# Patient Record
Sex: MALE | Race: WHITE | NOT HISPANIC OR LATINO | Employment: OTHER | URBAN - METROPOLITAN AREA
[De-identification: names, ages, dates, MRNs, and addresses within clinical notes are randomized per-mention and may not be internally consistent; named-entity substitution may affect disease eponyms.]

---

## 2021-07-07 ENCOUNTER — HOSPITAL ENCOUNTER (EMERGENCY)
Facility: HOSPITAL | Age: 56
Discharge: HOME | End: 2021-07-08
Attending: EMERGENCY MEDICINE | Admitting: INTERNAL MEDICINE
Payer: COMMERCIAL

## 2021-07-07 DIAGNOSIS — R46.2 BIZARRE BEHAVIOR: Primary | ICD-10-CM

## 2021-07-07 DIAGNOSIS — F22 DELUSIONS (HCC): ICD-10-CM

## 2021-07-07 LAB
ALBUMIN SERPL BCP-MCNC: 4.9 G/DL (ref 3.4–4.8)
ALP SERPL-CCNC: 83.1 U/L (ref 10–129)
ALT SERPL W P-5'-P-CCNC: 120 U/L (ref 5–63)
ANION GAP SERPL CALCULATED.3IONS-SCNC: 17 MMOL/L (ref 4–13)
AST SERPL W P-5'-P-CCNC: 67 U/L (ref 15–41)
BASOPHILS # BLD AUTO: 0.03 THOUSANDS/ΜL (ref 0–0.1)
BASOPHILS NFR BLD AUTO: 0 % (ref 0–1)
BILIRUB SERPL-MCNC: 1.19 MG/DL (ref 0.3–1.2)
BUN SERPL-MCNC: 39 MG/DL (ref 6–20)
CALCIUM SERPL-MCNC: 10.6 MG/DL (ref 8.4–10.2)
CHLORIDE SERPL-SCNC: 101 MMOL/L (ref 96–108)
CO2 SERPL-SCNC: 22 MMOL/L (ref 22–33)
CREAT SERPL-MCNC: 1.35 MG/DL (ref 0.5–1.2)
EOSINOPHIL # BLD AUTO: 0.04 THOUSAND/ΜL (ref 0–0.61)
EOSINOPHIL NFR BLD AUTO: 1 % (ref 0–6)
ERYTHROCYTE [DISTWIDTH] IN BLOOD BY AUTOMATED COUNT: 13 % (ref 11.6–15.1)
ETHANOL EXG-MCNC: 0 MG/DL
ETHANOL SERPL-MCNC: <10 MG/DL
GFR SERPL CREATININE-BSD FRML MDRD: 58 ML/MIN/1.73SQ M
GLUCOSE SERPL-MCNC: 101 MG/DL (ref 65–140)
HCT VFR BLD AUTO: 49.6 % (ref 36.5–49.3)
HGB BLD-MCNC: 17.6 G/DL (ref 12–17)
IMM GRANULOCYTES # BLD AUTO: 0.02 THOUSAND/UL (ref 0–0.2)
IMM GRANULOCYTES NFR BLD AUTO: 0 % (ref 0–2)
LYMPHOCYTES # BLD AUTO: 1.59 THOUSANDS/ΜL (ref 0.6–4.47)
LYMPHOCYTES NFR BLD AUTO: 19 % (ref 14–44)
MAGNESIUM SERPL-MCNC: 1.9 MG/DL (ref 1.6–2.6)
MCH RBC QN AUTO: 30.9 PG (ref 26.8–34.3)
MCHC RBC AUTO-ENTMCNC: 35.5 G/DL (ref 31.4–37.4)
MCV RBC AUTO: 87 FL (ref 82–98)
MONOCYTES # BLD AUTO: 1.44 THOUSAND/ΜL (ref 0.17–1.22)
MONOCYTES NFR BLD AUTO: 17 % (ref 4–12)
NEUTROPHILS # BLD AUTO: 5.33 THOUSANDS/ΜL (ref 1.85–7.62)
NEUTS SEG NFR BLD AUTO: 63 % (ref 43–75)
PLATELET # BLD AUTO: 188 THOUSANDS/UL (ref 149–390)
PMV BLD AUTO: 10.9 FL (ref 8.9–12.7)
POTASSIUM SERPL-SCNC: 3.4 MMOL/L (ref 3.5–5)
PROT SERPL-MCNC: 8 G/DL (ref 6.4–8.3)
RBC # BLD AUTO: 5.69 MILLION/UL (ref 3.88–5.62)
SODIUM SERPL-SCNC: 140 MMOL/L (ref 133–145)
WBC # BLD AUTO: 8.45 THOUSAND/UL (ref 4.31–10.16)

## 2021-07-07 PROCEDURE — 82075 ASSAY OF BREATH ETHANOL: CPT | Performed by: EMERGENCY MEDICINE

## 2021-07-07 PROCEDURE — 36415 COLL VENOUS BLD VENIPUNCTURE: CPT | Performed by: EMERGENCY MEDICINE

## 2021-07-07 PROCEDURE — 83735 ASSAY OF MAGNESIUM: CPT | Performed by: EMERGENCY MEDICINE

## 2021-07-07 PROCEDURE — 99285 EMERGENCY DEPT VISIT HI MDM: CPT | Performed by: EMERGENCY MEDICINE

## 2021-07-07 PROCEDURE — 87635 SARS-COV-2 COVID-19 AMP PRB: CPT | Performed by: EMERGENCY MEDICINE

## 2021-07-07 PROCEDURE — 99285 EMERGENCY DEPT VISIT HI MDM: CPT

## 2021-07-07 PROCEDURE — 84443 ASSAY THYROID STIM HORMONE: CPT | Performed by: EMERGENCY MEDICINE

## 2021-07-07 PROCEDURE — 82077 ASSAY SPEC XCP UR&BREATH IA: CPT | Performed by: EMERGENCY MEDICINE

## 2021-07-07 PROCEDURE — 80053 COMPREHEN METABOLIC PANEL: CPT | Performed by: EMERGENCY MEDICINE

## 2021-07-07 PROCEDURE — 85025 COMPLETE CBC W/AUTO DIFF WBC: CPT | Performed by: EMERGENCY MEDICINE

## 2021-07-07 PROCEDURE — 93005 ELECTROCARDIOGRAM TRACING: CPT

## 2021-07-08 VITALS
RESPIRATION RATE: 19 BRPM | BODY MASS INDEX: 21.09 KG/M2 | HEIGHT: 63 IN | SYSTOLIC BLOOD PRESSURE: 132 MMHG | TEMPERATURE: 97.8 F | HEART RATE: 80 BPM | OXYGEN SATURATION: 98 % | WEIGHT: 119 LBS | DIASTOLIC BLOOD PRESSURE: 76 MMHG

## 2021-07-08 LAB
ALBUMIN SERPL BCP-MCNC: 3.9 G/DL (ref 3.4–4.8)
ALP SERPL-CCNC: 65.7 U/L (ref 10–129)
ALT SERPL W P-5'-P-CCNC: 94 U/L (ref 5–63)
AMPHETAMINES SERPL QL SCN: NEGATIVE
ANION GAP SERPL CALCULATED.3IONS-SCNC: 13 MMOL/L (ref 4–13)
AST SERPL W P-5'-P-CCNC: 53 U/L (ref 15–41)
ATRIAL RATE: 100 BPM
BACTERIA UR QL AUTO: ABNORMAL /HPF
BARBITURATES UR QL: NEGATIVE
BENZODIAZ UR QL: NEGATIVE
BILIRUB SERPL-MCNC: 0.87 MG/DL (ref 0.3–1.2)
BILIRUB UR QL STRIP: ABNORMAL
BUN SERPL-MCNC: 34 MG/DL (ref 6–20)
CALCIUM SERPL-MCNC: 8.8 MG/DL (ref 8.4–10.2)
CHLORIDE SERPL-SCNC: 106 MMOL/L (ref 96–108)
CLARITY UR: CLEAR
CO2 SERPL-SCNC: 21 MMOL/L (ref 22–33)
COCAINE UR QL: NEGATIVE
COLOR UR: ABNORMAL
CREAT SERPL-MCNC: 0.98 MG/DL (ref 0.5–1.2)
GFR SERPL CREATININE-BSD FRML MDRD: 85 ML/MIN/1.73SQ M
GLUCOSE SERPL-MCNC: 67 MG/DL (ref 65–140)
GLUCOSE UR STRIP-MCNC: NEGATIVE MG/DL
HGB UR QL STRIP.AUTO: NEGATIVE
HYALINE CASTS #/AREA URNS LPF: ABNORMAL /LPF
KETONES UR STRIP-MCNC: ABNORMAL MG/DL
LEUKOCYTE ESTERASE UR QL STRIP: NEGATIVE
METHADONE UR QL: NEGATIVE
MUCOUS THREADS UR QL AUTO: ABNORMAL
NITRITE UR QL STRIP: NEGATIVE
NON-SQ EPI CELLS URNS QL MICRO: ABNORMAL /HPF
OPIATES UR QL SCN: NEGATIVE
OXYCODONE+OXYMORPHONE UR QL SCN: NEGATIVE
P AXIS: 90 DEGREES
PCP UR QL: NEGATIVE
PH UR STRIP.AUTO: 6 [PH]
POTASSIUM SERPL-SCNC: 3.6 MMOL/L (ref 3.5–5)
PR INTERVAL: 101 MS
PROT SERPL-MCNC: 6.4 G/DL (ref 6.4–8.3)
PROT UR STRIP-MCNC: ABNORMAL MG/DL
QRS AXIS: 82 DEGREES
QRSD INTERVAL: 95 MS
QT INTERVAL: 362 MS
QTC INTERVAL: 463 MS
RBC #/AREA URNS AUTO: ABNORMAL /HPF
SARS-COV-2 RNA RESP QL NAA+PROBE: NEGATIVE
SODIUM SERPL-SCNC: 140 MMOL/L (ref 133–145)
SP GR UR STRIP.AUTO: >=1.03 (ref 1–1.03)
T WAVE AXIS: -52 DEGREES
THC UR QL: NEGATIVE
TSH SERPL DL<=0.05 MIU/L-ACNC: 1.86 UIU/ML (ref 0.34–5.6)
UROBILINOGEN UR QL STRIP.AUTO: 1 E.U./DL
VENTRICULAR RATE: 98 BPM
WBC #/AREA URNS AUTO: ABNORMAL /HPF

## 2021-07-08 PROCEDURE — 36415 COLL VENOUS BLD VENIPUNCTURE: CPT | Performed by: EMERGENCY MEDICINE

## 2021-07-08 PROCEDURE — 80307 DRUG TEST PRSMV CHEM ANLYZR: CPT | Performed by: EMERGENCY MEDICINE

## 2021-07-08 PROCEDURE — 96361 HYDRATE IV INFUSION ADD-ON: CPT

## 2021-07-08 PROCEDURE — 93010 ELECTROCARDIOGRAM REPORT: CPT | Performed by: INTERNAL MEDICINE

## 2021-07-08 PROCEDURE — 96360 HYDRATION IV INFUSION INIT: CPT

## 2021-07-08 PROCEDURE — 80053 COMPREHEN METABOLIC PANEL: CPT | Performed by: EMERGENCY MEDICINE

## 2021-07-08 PROCEDURE — 81001 URINALYSIS AUTO W/SCOPE: CPT | Performed by: EMERGENCY MEDICINE

## 2021-07-08 RX ORDER — POTASSIUM CHLORIDE 20 MEQ/1
20 TABLET, EXTENDED RELEASE ORAL ONCE
Status: COMPLETED | OUTPATIENT
Start: 2021-07-08 | End: 2021-07-08

## 2021-07-08 RX ADMIN — SODIUM CHLORIDE 1000 ML: 0.9 INJECTION, SOLUTION INTRAVENOUS at 00:19

## 2021-07-08 RX ADMIN — POTASSIUM CHLORIDE 20 MEQ: 1500 TABLET, EXTENDED RELEASE ORAL at 00:21

## 2021-07-08 RX ADMIN — SODIUM CHLORIDE 1000 ML: 0.9 INJECTION, SOLUTION INTRAVENOUS at 00:17

## 2021-07-08 NOTE — ED NOTES
Patient is a 61year old male who arrived to the ED via ambulance last night  He reports that his friend called 911 because he was irritated and loud and typically he is very calm  He is medically cleared and evaluated in person the morning after arrival   Patient is noted to be disheveled with poor oral hygiene and overgrown hair  Cheeks are sunken in  He is pleasant, however, and very cooperative  He was oriented to person, place, and situation  Needed cueing for time / date  He was clearly religiously preoccupied with slight paranoia about a neighbor and/or witch that he believes left a bag of snake heads in his new apartment  He was easily redirected from this, but 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  He reports that he was previously  with 6 children and that he "messed up"  Whether or not it was related to the demise of his marriage, he reported that he was previously an IV heroin user and that he had related criminal charges to this along with theft  He denies violent crimes  He reports that he is currently on Probation with [facility] for past charges  He reports that he became a  and his life changed and God healed him through prayer  He denies any current or past suicidal ideas, plan, intent  Denies self harm  Denies any homicidal ideas, plan, intent  Denies violence, aggression, and harm to others  He does candidly admit to hearing voices and seeing things, but attests that this is a part of his spiritual awakening and that he is a messenger of God  He reports also seeing and hearing demons, but he is able to dispell these  Furthermore, he reports that he has experienced this his entire life and knows the difference between this and the physical world and he would never respond to the voices or visions in a way that would be harmful to himself or others    While he did allude to some paranoia about snake heads being placed in his apartment, he mentioned this only once briefly and otherwise expressed no paranoid delusions  His religiousity was evident, but there was no associated dangerousness  He admits subjectively to depression and anxiety, but has a very positive outlook and he is rather upbeat with no outward signs of depression or anxiety  He is also able to engage in conversation without any evidence that he is actively responding to internal stimuli  Patient admits that his sleep and appetite are sometimes affected by his need to pray or influenced by his devotional acts, such as fasting or praying into the night, but he assures that when he is fasting, he is still eating one meal per day, and that when he needs to sleep, he does  He admits that he is affected by others' emotions and strife and will sometimes be preoccupied with this and unable to sleep, so when that happens, he stays awake to pray and feels that this has helped save people  He talks about a women with a 12 lb tumor whom he prayed for and who is alive today  There is some grandiosity in this, but again, no overt dangerousness  Patient [facility], but this is questionable as he adds that he is waiting for his PCP to make the arrangements since Covid  He also admits that while he was previously on Celexa and BuSpar, he "doesn't take medicine    I don't need it"  Insight is limited, but again, there is no lethality and no overt concerns for safety related to his thought disturbance and Alevism focus  He was offered a 201, but declined  He was offered Partial referral; declined  Offered outpatient resources; declined (but provided anyway and encouraged to review in the event that the [facility] and/or 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; he was accepting of this)  No criteria for 302  Patient requests phone to call a friend for discharge and a phone was provided    Dr Jamal Reeves aware and is in support  Pt is a 61 y o  male who presented to the ED due to   Chief Complaint   Patient presents with   [facility]     pt presents to ed via EMS after he called them for a snake bit to the right foot and now he has babies living in his foot  also states the witch that lives below him is controling all the ticks and making them bit him and give him lymes diesease pt denies SI or HI        Intake Assessment completed, Safety Risk Assessment completed

## 2021-07-08 NOTE — ED NOTES
Patient sleeping on stretcher with even and unlabored respirations       Liv Harvey RN  07/08/21 8656

## 2021-07-08 NOTE — ED NOTES
Patient resting on stretcher with even respirations, no distress noted        Sameera Guillaume RN  07/08/21 9138

## 2021-07-08 NOTE — ED PROVIDER NOTES
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  Patient states that he previously used heroin and other drugs  Patient states that he is supposed to be on multiple medications, but discontinued them approximately 2 months ago  Patient denies suicidal and homicidal ideation  Patient states that he is a   None       History reviewed  No pertinent past medical history  History reviewed  No pertinent surgical history  History reviewed  No pertinent family history  I have reviewed and agree with the history as documented  E-Cigarette/Vaping    E-Cigarette Use Never User      E-Cigarette/Vaping Substances     Social History     Tobacco Use    Smoking status: Current Every Day Smoker    Smokeless tobacco: Never Used   Vaping Use    Vaping Use: Never used   Substance Use Topics    Alcohol use: Not Currently    Drug use: Yes     Types: Marijuana       Review of Systems   Constitutional: Negative for chills and fever  HENT: Negative for ear pain and sore throat  Eyes: Negative for pain and visual disturbance  Respiratory: Negative for cough and shortness of breath  Cardiovascular: Negative for chest pain and palpitations  Gastrointestinal: Negative for abdominal pain and vomiting  Genitourinary: Negative for dysuria and hematuria     Musculoskeletal: Negative for arthralgias and back pain  Skin: Negative for color change and rash  Neurological: Negative for seizures and syncope  Psychiatric/Behavioral: Negative for self-injury and suicidal ideas  Paranoid delusions   All other systems reviewed and are negative  Physical Exam  Physical Exam  Vitals and nursing note reviewed  Constitutional:       General: He is not in acute distress  Appearance: He is well-developed  HENT:      Head: Normocephalic and atraumatic  Right Ear: External ear normal       Left Ear: External ear normal       Nose: Nose normal       Mouth/Throat:      Pharynx: Oropharynx is clear  Eyes:      General: No scleral icterus  Conjunctiva/sclera: Conjunctivae normal    Cardiovascular:      Rate and Rhythm: Normal rate and regular rhythm  Heart sounds: No murmur heard  Pulmonary:      Effort: Pulmonary effort is normal  No respiratory distress  Breath sounds: Normal breath sounds  Abdominal:      Palpations: Abdomen is soft  Tenderness: There is no abdominal tenderness  Musculoskeletal:         General: No deformity or signs of injury  Cervical back: Normal range of motion and neck supple  Skin:     General: Skin is warm and dry  Neurological:      General: No focal deficit present  Mental Status: He is alert  Cranial Nerves: No cranial nerve deficit  Sensory: No sensory deficit     Psychiatric:      Comments: Appears anxious; paranoid delusions; no homicidal or suicidal ideation         Vital Signs  ED Triage Vitals [07/07/21 2244]   Temperature Pulse Respirations Blood Pressure SpO2   97 8 °F (36 6 °C) (!) 106 18 119/90 97 %      Temp Source Heart Rate Source Patient Position - Orthostatic VS BP Location FiO2 (%)   Oral Monitor Sitting Left arm --      Pain Score       --           Vitals:    07/07/21 2244 07/08/21 0247   BP: 119/90 132/76   Pulse: (!) 106 80   Patient Position - Orthostatic VS: Sitting Lying         Visual Acuity      ED Medications  Medications   potassium chloride (K-DUR,KLOR-CON) CR tablet 20 mEq (20 mEq Oral Given 7/8/21 0021)   sodium chloride 0 9 % bolus 1,000 mL (0 mL Intravenous Stopped 7/8/21 0242)   sodium chloride 0 9 % bolus 1,000 mL (0 mL Intravenous Stopped 7/8/21 0242)       Diagnostic Studies  Results Reviewed     Procedure Component Value Units Date/Time    Urine Microscopic [[id number]]  (Abnormal) Collected: 07/08/21 0359    Lab Status: Final result Specimen: Urine, Clean Catch Updated: 07/08/21 0447     RBC, UA 0-5 /hpf      WBC, UA 4-10 /hpf      Epithelial Cells Occasional /hpf      Bacteria, UA Innumerable /hpf      Hyaline Casts, UA 4-10 /lpf      MUCUS THREADS Innumerable    Rapid drug screen, urine [[id number]]  (Normal) Collected: 07/08/21 0359    Lab Status: Final result Specimen: Urine, Clean Catch Updated: 07/08/21 0441     Amph/Meth UR Negative     Barbiturate Ur Negative     Benzodiazepine Urine Negative     Cocaine Urine Negative     Methadone Urine Negative     Opiate Urine Negative     PCP Ur Negative     THC Urine Negative     Oxycodone Urine Negative    Narrative:      FOR MEDICAL PURPOSES ONLY  IF CONFIRMATION NEEDED PLEASE CONTACT THE LAB WITHIN 5 DAYS      Drug Screen Cutoff Levels:  AMPHETAMINE/METHAMPHETAMINES  1000 ng/mL  BARBITURATES     200 ng/mL  BENZODIAZEPINES     200 ng/mL  COCAINE      300 ng/mL  METHADONE      300 ng/mL  OPIATES      300 ng/mL  PHENCYCLIDINE     25 ng/mL  THC       50 ng/mL  OXYCODONE      100 ng/mL    UA w Reflex to Microscopic w Reflex to Culture [[id number]]  (Abnormal) Collected: 07/08/21 0359    Lab Status: Final result Specimen: Urine, Clean Catch Updated: 07/08/21 0423     Color, UA Tran     Clarity, UA Clear     Specific Gravity, UA >=1 030     pH, UA 6 0     Leukocytes, UA Negative     Nitrite, UA Negative     Protein, UA Trace mg/dl      Glucose, UA Negative mg/dl      Ketones, UA 40 (2+) mg/dl      Urobilinogen, UA 1 0 E U /dl Bilirubin, UA 1+     Blood, UA Negative    Comprehensive metabolic panel [[id number]]  (Abnormal) Collected: 07/08/21 0242    Lab Status: Final result Specimen: Blood from Arm, Right Updated: 07/08/21 0350     Sodium 140 mmol/L      Potassium 3 6 mmol/L      Chloride 106 mmol/L      CO2 21 mmol/L      ANION GAP 13 mmol/L      BUN 34 mg/dL      Creatinine 0 98 mg/dL      Glucose 67 mg/dL      Calcium 8 8 mg/dL      AST 53 U/L      ALT 94 U/L      Alkaline Phosphatase 65 7 U/L      Total Protein 6 4 g/dL      Albumin 3 9 g/dL      Total Bilirubin 0 87 mg/dL      eGFR 85 ml/min/1 73sq m     Narrative:      [place] guidelines for Chronic Kidney Disease (CKD):     Stage 1 with normal or high GFR (GFR > 90 mL/min/1 73 square meters)    Stage 2 Mild CKD (GFR = 60-89 mL/min/1 73 square meters)    Stage 3A Moderate CKD (GFR = 45-59 mL/min/1 73 square meters)    Stage 3B Moderate CKD (GFR = 30-44 mL/min/1 73 square meters)    Stage 4 Severe CKD (GFR = 15-29 mL/min/1 73 square meters)    Stage 5 End Stage CKD (GFR <15 mL/min/1 73 square meters)  Note: GFR calculation is accurate only with a steady state creatinine    Novel Coronavirus (Covid-19),PCR [facility] - 2 Hour Stat [[id number]]  (Normal) Collected: 07/07/21 2314    Lab Status: Final result Specimen: Nares from Nose Updated: 07/08/21 0034     SARS-CoV-2 Negative    Narrative: The specimen collection materials, transport medium, and/or testing methodology utilized in the production of these test results have been proven to be reliable in a limited validation with an abbreviated program under the Emergency Utilization Authorization provided by the FDA  Testing reported as "Presumptive positive" will be confirmed with secondary testing to ensure result accuracy  Clinical caution and judgement should be used with the interpretation of these results with consideration of the clinical impression and other laboratory testing    Testing reported as "Positive" or "Negative" has been proven to be accurate according to standard laboratory validation requirements  All testing is performed with control materials showing appropriate reactivity at standard intervals  TSH [[id number]]  (Normal) Collected: 07/07/21 2314    Lab Status: Final result Specimen: Blood from Arm, Right Updated: 07/08/21 0003     TSH 3RD GENERATON 1 862 uIU/mL     Narrative:      Patients undergoing fluorescein dye angiography may retain small amounts of fluorescein in the body for 48-72 hours post procedure  Samples containing fluorescein can produce falsely depressed TSH values  If the patient had this procedure,a specimen should be resubmitted post fluorescein clearance        Comprehensive metabolic panel [[id number]]  (Abnormal) Collected: 07/07/21 2314    Lab Status: Final result Specimen: Blood from Arm, Right Updated: 07/07/21 2345     Sodium 140 mmol/L      Potassium 3 4 mmol/L      Chloride 101 mmol/L      CO2 22 mmol/L      ANION GAP 17 mmol/L      BUN 39 mg/dL      Creatinine 1 35 mg/dL      Glucose 101 mg/dL      Calcium 10 6 mg/dL      AST 67 U/L       U/L      Alkaline Phosphatase 83 1 U/L      Total Protein 8 0 g/dL      Albumin 4 9 g/dL      Total Bilirubin 1 19 mg/dL      eGFR 58 ml/min/1 73sq m     Narrative:      [place] guidelines for Chronic Kidney Disease (CKD):     Stage 1 with normal or high GFR (GFR > 90 mL/min/1 73 square meters)    Stage 2 Mild CKD (GFR = 60-89 mL/min/1 73 square meters)    Stage 3A Moderate CKD (GFR = 45-59 mL/min/1 73 square meters)    Stage 3B Moderate CKD (GFR = 30-44 mL/min/1 73 square meters)    Stage 4 Severe CKD (GFR = 15-29 mL/min/1 73 square meters)    Stage 5 End Stage CKD (GFR <15 mL/min/1 73 square meters)  Note: GFR calculation is accurate only with a steady state creatinine    Ethanol [[id number]]  (Normal) Collected: 07/07/21 2314    Lab Status: Final result Specimen: Blood from Arm, Right Updated: 07/07/21 2345     Ethanol Lvl <10 mg/dL     Magnesium [[id number]]  (Normal) Collected: 07/07/21 2314    Lab Status: Final result Specimen: Blood from Arm, Right Updated: 07/07/21 2345     Magnesium 1 9 mg/dL     CBC and differential [[id number]]  (Abnormal) Collected: 07/07/21 2314    Lab Status: Final result Specimen: Blood from Arm, Right Updated: 07/07/21 2334     WBC 8 45 Thousand/uL      RBC 5 69 Million/uL      Hemoglobin 17 6 g/dL      Hematocrit 49 6 %      MCV 87 fL      MCH 30 9 pg      MCHC 35 5 g/dL      RDW 13 0 %      MPV 10 9 fL      Platelets 845 Thousands/uL      Neutrophils Relative 63 %      Immat GRANS % 0 %      Lymphocytes Relative 19 %      Monocytes Relative 17 %      Eosinophils Relative 1 %      Basophils Relative 0 %      Neutrophils Absolute 5 33 Thousands/µL      Immature Grans Absolute 0 02 Thousand/uL      Lymphocytes Absolute 1 59 Thousands/µL      Monocytes Absolute 1 44 Thousand/µL      Eosinophils Absolute 0 04 Thousand/µL      Basophils Absolute 0 03 Thousands/µL     POCT alcohol breath test [[id number]]  (Normal) Resulted: 07/07/21 2254    Lab Status: Final result Updated: 07/07/21 2254     EXTBreath Alcohol 0 000                 No orders to display              Procedures  ECG 12 Lead Documentation Only    Date/Time: 7/7/2021 11:05 PM  Performed by: Jessie Luong MD  Authorized by: Jsesie Luong MD     Indications / Diagnosis:  Bizarre behavior  ECG reviewed by me, the ED Provider: yes    Patient location:  ED  Rate:     ECG rate:  98    ECG rate assessment: normal    Rhythm:     Rhythm: sinus rhythm    QRS:     QRS axis:  Normal  ST segments:     ST segments:  Non-specific  T waves:     T waves: non-specific    Comments:      Sinus rhythm at 98, normal axis, , QRS 95, QTc 463, nonspecific ST-T wave abnormality, no definite evidence of acute ischemia             ED Course                             SBIRT 20yo+      Most Recent Value   SBIRT (23 yo +)   In order to provide better care to our patients, we are screening all of our patients for alcohol and drug use  Would it be okay to ask you these screening questions? No Filed at: 07/07/2021 1028                    MDM  Number of Diagnoses or Management Options  Bizarre behavior  Delusions [facility])  Diagnosis management comments: Patient is a 50-year-old male seen in the emergency department with concern for bizarre behavior, paranoid delusions  EKG was obtained and noted  COVID 19 swab was ordered during global pandemic  Laboratory evaluation remarkable for low potassium of 3 4, elevated anion gap of 17, elevated BUN of 39, elevated creatinine of 1 35, elevated calcium of 10 6, elevated AST of 67, elevated ALT of 120, elevated albumin 4 9, elevated red blood cell count of 5 69, elevated hemoglobin of 17 6, elevated hematocrit 49 6  Patient was treated with IV fluids in the emergency department, with repeat chemistry remarkable for low bicarbonate of 21, normal anion gap of 13, elevated BUN of 34, elevated AST of 53, elevated ALT of 94, urinalysis positive for trace protein, 2+ ketones, 1+ bilirubin, 0-5 red blood cells, 4-10 white blood cells, occasional epithelial cells, innumerable bacteria, 4-10 hyaline casts, innumerable mucus threads   Patient is medically cleared for evaluation by crisis team  Patient is to be signed out to my colleague at change of shift, medically cleared for evaluation by crisis team        Amount and/or Complexity of Data Reviewed  Clinical lab tests: ordered and reviewed  Tests in the medicine section of CPT®: ordered and reviewed      Disposition  Final diagnoses:   Bizarre behavior   [facility]     Time reflects when diagnosis was documented in both MDM as applicable and the Disposition within this note     Time User Action Codes Description Comment    7/7/2021 10:53 PM Edmonia Prim Add [R46 2] Bizarre behavior     7/7/2021 10:53 PM Edmonia Prim Add [F22] Delusions ([facility] [address] ) ED Disposition     None      Follow-up Information    None         Patient's Medications    No medications on file     No discharge procedures on file      PDMP Review     None          ED Provider  Electronically Signed by           Cornelia Espinosa MD  07/08/21 0407       Cornelia Espinosa MD  07/08/21 1712       Cornelia Espinosa MD  07/08/21 4040       Cornelia Espinosa MD  07/08/21 2361

## 2021-07-08 NOTE — ED NOTES
Patient ambulated to restroom in attempt to provide urine sample with slow gait        Madelin Cruz RN  07/08/21 3757

## 2021-07-08 NOTE — ED NOTES
Pt ambulated to the bathroom to provide a urine sample  On the way he complained of back pain stating he has a "bad back"  Pt claims he doesn't take pain meds  Pt was unable to provide a urine sample and ambulated back to his room  Pt then requested more water and was provided with it  Pt stated he has extreme thirst and an issue with his kidneys  States he drinks a lot of water but has noticed dark urine        Tanja Obregon  07/07/21 1429

## 2021-07-08 NOTE — ED NOTES
Assumed care of patient at this time, patient observed sitting up in bed, does not appear to be in any distress, cooperative at this time  Will continue to monitor        Alla Walker RN  07/08/21 3014

## 2021-07-08 NOTE — DISCHARGE INSTRUCTIONS
Follow up with your psychiatrist as scheduled  You can come back to er at any times for any new symptoms, if you feel depressed , suicidal , homicidal , hallucinating or feel you need help , we are here for helping you all the time    Labs Reviewed   UA W REFLEX TO MICROSCOPIC WITH REFLEX TO CULTURE - Abnormal       Result Value Ref Range Status    Color, UA Tran (*) Yellow Final    Clarity, UA Clear  Clear Final    Specific Gravity, UA >=1 030  1 001 - 1 030 Final    pH, UA 6 0  5 0, 5 5, 6 0, 6 5, 7 0, 7 5, 8 0 Final    Leukocytes, UA Negative  Negative Final    Nitrite, UA Negative  Negative Final    Protein, UA Trace (*) Negative, Interference- unable to analyze mg/dl Final    Glucose, UA Negative  Negative mg/dl Final    Ketones, UA 40 (2+) (*) Negative mg/dl Final    Urobilinogen, UA 1 0  0 2, 1 0 E U /dl E U /dl Final    Bilirubin, UA 1+ (*) Negative Final    Blood, UA Negative  Negative Final   CBC AND DIFFERENTIAL - Abnormal    WBC 8 45  4 31 - 10 16 Thousand/uL Final    RBC 5 69 (*) 3 88 - 5 62 Million/uL Final    Hemoglobin 17 6 (*) 12 0 - 17 0 g/dL Final    Hematocrit 49 6 (*) 36 5 - 49 3 % Final    MCV 87  82 - 98 fL Final    MCH 30 9  26 8 - 34 3 pg Final    MCHC 35 5  31 4 - 37 4 g/dL Final    RDW 13 0  11 6 - 15 1 % Final    MPV 10 9  8 9 - 12 7 fL Final    Platelets 618  396 - 390 Thousands/uL Final    Neutrophils Relative 63  43 - 75 % Final    Immat GRANS % 0  0 - 2 % Final    Lymphocytes Relative 19  14 - 44 % Final    Monocytes Relative 17 (*) 4 - 12 % Final    Eosinophils Relative 1  0 - 6 % Final    Basophils Relative 0  0 - 1 % Final    Neutrophils Absolute 5 33  1 85 - 7 62 Thousands/µL Final    Immature Grans Absolute 0 02  0 00 - 0 20 Thousand/uL Final    Lymphocytes Absolute 1 59  0 60 - 4 47 Thousands/µL Final    Monocytes Absolute 1 44 (*) 0 17 - 1 22 Thousand/µL Final    Eosinophils Absolute 0 04  0 00 - 0 61 Thousand/µL Final    Basophils Absolute 0 03  0 00 - 0 10 Thousands/µL Final COMPREHENSIVE METABOLIC PANEL - Abnormal    Sodium 140  133 - 145 mmol/L Final    Potassium 3 4 (*) 3 5 - 5 0 mmol/L Final    Chloride 101  96 - 108 mmol/L Final    CO2 22  22 - 33 mmol/L Final    ANION GAP 17 (*) 4 - 13 mmol/L Final    BUN 39 (*) 6 - 20 mg/dL Final    Creatinine 1 35 (*) 0 50 - 1 20 mg/dL Final    Comment: Standardized to IDMS reference method    Glucose 101  65 - 140 mg/dL Final    Comment: If the patient is fasting, the ADA then defines impaired fasting glucose as > 100 mg/dL and diabetes as > or equal to 123 mg/dL  Specimen collection should occur prior to Sulfasalazine administration due to the potential for falsely depressed results  Specimen collection should occur prior to Sulfapyridine administration due to the potential for falsely elevated results  Calcium 10 6 (*) 8 4 - 10 2 mg/dL Final    AST 67 (*) 15 - 41 U/L Final    Comment: Specimen collection should occur prior to Sulfasalazine administration due to the potential for falsely depressed results   (*) 5 - 63 U/L Final    Comment: Specimen collection should occur prior to Sulfasalazine administration due to the potential for falsely depressed results       Alkaline Phosphatase 83 1  10 - 129 U/L Final    Total Protein 8 0  6 4 - 8 3 g/dL Final    Albumin 4 9 (*) 3 4 - 4 8 g/dL Final    Total Bilirubin 1 19  0 30 - 1 20 mg/dL Final    eGFR 58  ml/min/1 73sq m Final    Narrative:     [place] guidelines for Chronic Kidney Disease (CKD):     Stage 1 with normal or high GFR (GFR > 90 mL/min/1 73 square meters)    Stage 2 Mild CKD (GFR = 60-89 mL/min/1 73 square meters)    Stage 3A Moderate CKD (GFR = 45-59 mL/min/1 73 square meters)    Stage 3B Moderate CKD (GFR = 30-44 mL/min/1 73 square meters)    Stage 4 Severe CKD (GFR = 15-29 mL/min/1 73 square meters)    Stage 5 End Stage CKD (GFR <15 mL/min/1 73 square meters)  Note: GFR calculation is accurate only with a steady state creatinine COMPREHENSIVE METABOLIC PANEL - Abnormal    Sodium 140  133 - 145 mmol/L Final    Potassium 3 6  3 5 - 5 0 mmol/L Final    Chloride 106  96 - 108 mmol/L Final    CO2 21 (*) 22 - 33 mmol/L Final    ANION GAP 13  4 - 13 mmol/L Final    BUN 34 (*) 6 - 20 mg/dL Final    Creatinine 0 98  0 50 - 1 20 mg/dL Final    Comment: Standardized to IDMS reference method    Glucose 67  65 - 140 mg/dL Final    Comment: If the patient is fasting, the ADA then defines impaired fasting glucose as > 100 mg/dL and diabetes as > or equal to 123 mg/dL  Specimen collection should occur prior to Sulfasalazine administration due to the potential for falsely depressed results  Specimen collection should occur prior to Sulfapyridine administration due to the potential for falsely elevated results  Calcium 8 8  8 4 - 10 2 mg/dL Final    AST 53 (*) 15 - 41 U/L Final    Comment: Specimen collection should occur prior to Sulfasalazine administration due to the potential for falsely depressed results  ALT 94 (*) 5 - 63 U/L Final    Comment: Specimen collection should occur prior to Sulfasalazine administration due to the potential for falsely depressed results       Alkaline Phosphatase 65 7  10 - 129 U/L Final    Total Protein 6 4  6 4 - 8 3 g/dL Final    Albumin 3 9  3 4 - 4 8 g/dL Final    Total Bilirubin 0 87  0 30 - 1 20 mg/dL Final    eGFR 85  ml/min/1 73sq m Final    Narrative:     [place] guidelines for Chronic Kidney Disease (CKD):     Stage 1 with normal or high GFR (GFR > 90 mL/min/1 73 square meters)    Stage 2 Mild CKD (GFR = 60-89 mL/min/1 73 square meters)    Stage 3A Moderate CKD (GFR = 45-59 mL/min/1 73 square meters)    Stage 3B Moderate CKD (GFR = 30-44 mL/min/1 73 square meters)    Stage 4 Severe CKD (GFR = 15-29 mL/min/1 73 square meters)    Stage 5 End Stage CKD (GFR <15 mL/min/1 73 square meters)  Note: GFR calculation is accurate only with a steady state creatinine   URINE MICROSCOPIC - Abnormal    RBC, UA 0-5  None Seen, 0-1, 1-2, 2-4, 0-5 /hpf Final    WBC, UA 4-10 (*) None Seen, 0-1, 1-2, 0-5, 2-4 /hpf Final    Epithelial Cells Occasional  None Seen, Occasional /hpf Final    Bacteria, UA Innumerable (*) None Seen, Occasional /hpf Final    Hyaline Casts, UA 4-10 (*) (none) /lpf Final    MUCUS THREADS Innumerable (*) None Seen Final   NOVEL CORONAVIRUS (COVID-19), PCR [facility] - Normal    SARS-CoV-2 Negative  Negative Final    Narrative: The specimen collection materials, transport medium, and/or testing methodology utilized in the production of these test results have been proven to be reliable in a limited validation with an abbreviated program under the Emergency Utilization Authorization provided by the FDA  Testing reported as "Presumptive positive" will be confirmed with secondary testing to ensure result accuracy  Clinical caution and judgement should be used with the interpretation of these results with consideration of the clinical impression and other laboratory testing  Testing reported as "Positive" or "Negative" has been proven to be accurate according to standard laboratory validation requirements  All testing is performed with control materials showing appropriate reactivity at standard intervals  RAPID DRUG SCREEN, URINE - Normal    Amph/Meth UR Negative  Negative Final    Barbiturate Ur Negative  Negative Final    Benzodiazepine Urine Negative  Negative Final    Cocaine Urine Negative  Negative Final    Methadone Urine Negative  Negative Final    Opiate Urine Negative  Negative Final    PCP Ur Negative  Negative Final    THC Urine Negative  Negative Final    Oxycodone Urine Negative  Negative Final    Narrative:     FOR MEDICAL PURPOSES ONLY  IF CONFIRMATION NEEDED PLEASE CONTACT THE LAB WITHIN 5 DAYS      Drug Screen Cutoff Levels:  AMPHETAMINE/METHAMPHETAMINES  1000 ng/mL  BARBITURATES     200 ng/mL  BENZODIAZEPINES     200 ng/mL  COCAINE      300 ng/mL  METHADONE      300 ng/mL  OPIATES      300 ng/mL  PHENCYCLIDINE     25 ng/mL  THC       50 ng/mL  OXYCODONE      100 ng/mL   TSH, 3RD GENERATION - Normal    TSH 3RD GENERATON 1 862  0 340 - 5 600 uIU/mL Final    Narrative:     Patients undergoing fluorescein dye angiography may retain small amounts of fluorescein in the body for 48-72 hours post procedure  Samples containing fluorescein can produce falsely depressed TSH values  If the patient had this procedure,a specimen should be resubmitted post fluorescein clearance       MEDICAL ALCOHOL - Normal    Ethanol Lvl <10  <10 mg/dL Final   MAGNESIUM - Normal    Magnesium 1 9  1 6 - 2 6 mg/dL Final   POCT ALCOHOL BREATH TEST - Normal    EXTBreath Alcohol 0 000   Final       No orders to display

## 2021-07-10 ENCOUNTER — APPOINTMENT (EMERGENCY)
Dept: RADIOLOGY | Facility: HOSPITAL | Age: 56
End: 2021-07-10
Payer: COMMERCIAL

## 2021-07-10 ENCOUNTER — HOSPITAL ENCOUNTER (EMERGENCY)
Facility: HOSPITAL | Age: 56
Discharge: HOME | End: 2021-07-10
Payer: COMMERCIAL

## 2021-07-10 VITALS
SYSTOLIC BLOOD PRESSURE: 101 MMHG | RESPIRATION RATE: 18 BRPM | DIASTOLIC BLOOD PRESSURE: 75 MMHG | HEART RATE: 65 BPM | WEIGHT: 119 LBS | OXYGEN SATURATION: 100 % | BODY MASS INDEX: 21.08 KG/M2 | TEMPERATURE: 98.2 F

## 2021-07-10 DIAGNOSIS — F22 DELUSIONAL DISORDER (HCC): ICD-10-CM

## 2021-07-10 DIAGNOSIS — R07.89 ATYPICAL CHEST PAIN: Primary | ICD-10-CM

## 2021-07-10 LAB
ALBUMIN SERPL BCP-MCNC: 3.6 G/DL (ref 3.4–4.8)
ALP SERPL-CCNC: 64.2 U/L (ref 10–129)
ALT SERPL W P-5'-P-CCNC: 68 U/L (ref 5–63)
ANION GAP SERPL CALCULATED.3IONS-SCNC: 5 MMOL/L (ref 4–13)
AST SERPL W P-5'-P-CCNC: 35 U/L (ref 15–41)
BASOPHILS # BLD AUTO: 0.03 THOUSANDS/ΜL (ref 0–0.1)
BASOPHILS NFR BLD AUTO: 1 % (ref 0–1)
BILIRUB SERPL-MCNC: 0.37 MG/DL (ref 0.3–1.2)
BUN SERPL-MCNC: 9 MG/DL (ref 6–20)
CALCIUM SERPL-MCNC: 8.4 MG/DL (ref 8.4–10.2)
CHLORIDE SERPL-SCNC: 106 MMOL/L (ref 96–108)
CO2 SERPL-SCNC: 30 MMOL/L (ref 22–33)
CREAT SERPL-MCNC: 0.69 MG/DL (ref 0.5–1.2)
D DIMER PPP FEU-MCNC: 0.36 MG/L FEU (ref 0.19–0.49)
EOSINOPHIL # BLD AUTO: 0.18 THOUSAND/ΜL (ref 0–0.61)
EOSINOPHIL NFR BLD AUTO: 4 % (ref 0–6)
ERYTHROCYTE [DISTWIDTH] IN BLOOD BY AUTOMATED COUNT: 13 % (ref 11.6–15.1)
GFR SERPL CREATININE-BSD FRML MDRD: 105 ML/MIN/1.73SQ M
GLUCOSE SERPL-MCNC: 99 MG/DL (ref 65–140)
HCT VFR BLD AUTO: 42.4 % (ref 36.5–49.3)
HGB BLD-MCNC: 14.5 G/DL (ref 12–17)
IMM GRANULOCYTES # BLD AUTO: 0 THOUSAND/UL (ref 0–0.2)
IMM GRANULOCYTES NFR BLD AUTO: 0 % (ref 0–2)
LYMPHOCYTES # BLD AUTO: 1.04 THOUSANDS/ΜL (ref 0.6–4.47)
LYMPHOCYTES NFR BLD AUTO: 25 % (ref 14–44)
MCH RBC QN AUTO: 30.9 PG (ref 26.8–34.3)
MCHC RBC AUTO-ENTMCNC: 34.2 G/DL (ref 31.4–37.4)
MCV RBC AUTO: 90 FL (ref 82–98)
MONOCYTES # BLD AUTO: 0.65 THOUSAND/ΜL (ref 0.17–1.22)
MONOCYTES NFR BLD AUTO: 16 % (ref 4–12)
NEUTROPHILS # BLD AUTO: 2.2 THOUSANDS/ΜL (ref 1.85–7.62)
NEUTS SEG NFR BLD AUTO: 54 % (ref 43–75)
PLATELET # BLD AUTO: 175 THOUSANDS/UL (ref 149–390)
PMV BLD AUTO: 11 FL (ref 8.9–12.7)
POTASSIUM SERPL-SCNC: 3.8 MMOL/L (ref 3.5–5)
PROT SERPL-MCNC: 5.9 G/DL (ref 6.4–8.3)
RBC # BLD AUTO: 4.69 MILLION/UL (ref 3.88–5.62)
SODIUM SERPL-SCNC: 141 MMOL/L (ref 133–145)
TROPONIN I SERPL-MCNC: <0.03 NG/ML (ref 0–0.07)
TROPONIN I SERPL-MCNC: <0.03 NG/ML (ref 0–0.07)
WBC # BLD AUTO: 4.1 THOUSAND/UL (ref 4.31–10.16)

## 2021-07-10 PROCEDURE — 71045 X-RAY EXAM CHEST 1 VIEW: CPT

## 2021-07-10 PROCEDURE — 84484 ASSAY OF TROPONIN QUANT: CPT

## 2021-07-10 PROCEDURE — 99285 EMERGENCY DEPT VISIT HI MDM: CPT

## 2021-07-10 PROCEDURE — 93005 ELECTROCARDIOGRAM TRACING: CPT

## 2021-07-10 PROCEDURE — 85025 COMPLETE CBC W/AUTO DIFF WBC: CPT

## 2021-07-10 PROCEDURE — 36415 COLL VENOUS BLD VENIPUNCTURE: CPT

## 2021-07-10 PROCEDURE — 80053 COMPREHEN METABOLIC PANEL: CPT

## 2021-07-10 PROCEDURE — 85379 FIBRIN DEGRADATION QUANT: CPT

## 2021-07-10 RX ORDER — OLANZAPINE 10 MG/1
10 TABLET ORAL
Qty: 20 TABLET | Refills: 0 | Status: SHIPPED | OUTPATIENT
Start: 2021-07-10 | End: 2021-07-30

## 2021-07-10 RX ORDER — OLANZAPINE 5 MG/1
10 TABLET, ORALLY DISINTEGRATING ORAL ONCE
Status: COMPLETED | OUTPATIENT
Start: 2021-07-10 | End: 2021-07-10

## 2021-07-10 RX ADMIN — OLANZAPINE 10 MG: 5 TABLET, ORALLY DISINTEGRATING ORAL at 15:02

## 2021-07-10 NOTE — ED NOTES
Pt believes he has some kind of parasite, would like to know if the chest xray will show that         Hawa Larios RN  07/10/21 3676

## 2021-07-10 NOTE — ED PROVIDER NOTES
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  70-year-old male history behavior health issues here secondary to complaining of chest pain intermittently for the past day  Patient states he talks with got on a regular basis and he was told that he needed to come to emergency department today for scan of his chest   Patient is awake and alert no significant distress denies any shortness of breath  Patient states he just had a bandlike sensation across his chest any set God told me to come to the emergency department  Patient does have history delusional thought processes was here in for evaluation for this a few days ago  Patient denies any drug or alcohol use  Patient denies any prior cardiac history or significant cardiac workup in the past   Patient denies any URI symptoms denies any pain or swelling in the legs  Prior to Admission Medications   Prescriptions Last Dose Informant Patient Reported? Taking? Citalopram Hydrobromide (CELEXA PO) Unknown at Unknown time  Yes No   Sig: Take by mouth      Facility-Administered Medications: None       Past Medical History:   Diagnosis Date    Depression        History reviewed  No pertinent surgical history  History reviewed  No pertinent family history  I have reviewed and agree with the history as documented      E-Cigarette/Vaping    E-Cigarette Use Never User      E-Cigarette/Vaping Substances     Social History     Tobacco Use    Smoking status: Current Every Day Smoker     Packs/day: 0 25     Types: Cigarettes    Smokeless tobacco: Never Used   Vaping Use    Vaping Use: Never used   Substance Use Topics    Alcohol use: Not Currently    Drug use: Yes     Types: Marijuana       Review of Systems Constitutional: Negative for chills and fever  HENT: Negative for congestion  Eyes: Negative for visual disturbance  Respiratory: Negative for shortness of breath  Cardiovascular: Positive for chest pain  Gastrointestinal: Negative for abdominal pain  Endocrine: Negative for cold intolerance  Genitourinary: Negative for frequency  Musculoskeletal: Negative for gait problem  Skin: Negative for rash  Neurological: Negative for dizziness  Psychiatric/Behavioral: Negative for behavioral problems and confusion  Physical Exam  Physical Exam  Vitals and nursing note reviewed  Constitutional:       Appearance: He is well-developed  HENT:      Head: Normocephalic and atraumatic  Eyes:      Conjunctiva/sclera: Conjunctivae normal       Pupils: Pupils are equal, round, and reactive to light  Cardiovascular:      Rate and Rhythm: Normal rate and regular rhythm  Heart sounds: Normal heart sounds  Pulmonary:      Effort: Pulmonary effort is normal       Breath sounds: Normal breath sounds  Abdominal:      General: Bowel sounds are normal       Palpations: Abdomen is soft  Musculoskeletal:         General: Normal range of motion  Cervical back: Normal range of motion and neck supple  Skin:     General: Skin is warm and dry  Capillary Refill: Capillary refill takes less than 2 seconds  Neurological:      Mental Status: He is alert and oriented to person, place, and time     Psychiatric:         Behavior: Behavior normal          Vital Signs  ED Triage Vitals [07/10/21 1042]   Temperature Pulse Respirations Blood Pressure SpO2   98 2 °F (36 8 °C) 77 16 124/67 100 %      Temp Source Heart Rate Source Patient Position - Orthostatic VS BP Location FiO2 (%)   Oral Monitor -- -- --      Pain Score       3           Vitals:    07/10/21 1149 07/10/21 1245 07/10/21 1409 07/10/21 1500   BP: 108/67 103/73 96/66 101/75   Pulse: 74 67 61 65         Visual Acuity      ED Medications  Medications   OLANZapine (ZyPREXA ZYDIS) dispersible tablet 10 mg (10 mg Oral Given 7/10/21 1502)       Diagnostic Studies  Results Reviewed     Procedure Component Value Units Date/Time    Troponin I [[id number]]  (Normal) Collected: 07/10/21 1505    Lab Status: Final result Specimen: Blood from Arm, Left Updated: 07/10/21 1532     Troponin I <0 03 ng/mL     Comprehensive metabolic panel [[id number]]  (Abnormal) Collected: 07/10/21 1057    Lab Status: Final result Specimen: Blood from Arm, Left Updated: 07/10/21 1136     Sodium 141 mmol/L      Potassium 3 8 mmol/L      Chloride 106 mmol/L      CO2 30 mmol/L      ANION GAP 5 mmol/L      BUN 9 mg/dL      Creatinine 0 69 mg/dL      Glucose 99 mg/dL      Calcium 8 4 mg/dL      AST 35 U/L      ALT 68 U/L      Alkaline Phosphatase 64 2 U/L      Total Protein 5 9 g/dL      Albumin 3 6 g/dL      Total Bilirubin 0 37 mg/dL      eGFR 105 ml/min/1 73sq m     Narrative:      [place] guidelines for Chronic Kidney Disease (CKD):     Stage 1 with normal or high GFR (GFR > 90 mL/min/1 73 square meters)    Stage 2 Mild CKD (GFR = 60-89 mL/min/1 73 square meters)    Stage 3A Moderate CKD (GFR = 45-59 mL/min/1 73 square meters)    Stage 3B Moderate CKD (GFR = 30-44 mL/min/1 73 square meters)    Stage 4 Severe CKD (GFR = 15-29 mL/min/1 73 square meters)    Stage 5 End Stage CKD (GFR <15 mL/min/1 73 square meters)  Note: GFR calculation is accurate only with a steady state creatinine    Troponin I [[id number]]  (Normal) Collected: 07/10/21 1057    Lab Status: Final result Specimen: Blood from Arm, Left Updated: 07/10/21 1131     Troponin I <0 03 ng/mL     D-dimer, quantitative [[id number]]  (Normal) Collected: 07/10/21 1057    Lab Status: Final result Specimen: Blood from Arm, Left Updated: 07/10/21 1128     D-Dimer, Quant  0 36 mg/L FEU     CBC and differential [[id number]]  (Abnormal) Collected: 07/10/21 1057    Lab Status: Final result Specimen: Blood from Arm, Left Updated: 07/10/21 1112     WBC 4 10 Thousand/uL      RBC 4 69 Million/uL      Hemoglobin 14 5 g/dL      Hematocrit 42 4 %      MCV 90 fL      MCH 30 9 pg      MCHC 34 2 g/dL      RDW 13 0 %      MPV 11 0 fL      Platelets 189 Thousands/uL      Neutrophils Relative 54 %      Immat GRANS % 0 %      Lymphocytes Relative 25 %      Monocytes Relative 16 %      Eosinophils Relative 4 %      Basophils Relative 1 %      Neutrophils Absolute 2 20 Thousands/µL      Immature Grans Absolute 0 00 Thousand/uL      Lymphocytes Absolute 1 04 Thousands/µL      Monocytes Absolute 0 65 Thousand/µL      Eosinophils Absolute 0 18 Thousand/µL      Basophils Absolute 0 03 Thousands/µL                  XR chest 1 view portable    (Results Pending)              Procedures  ECG 12 Lead Documentation Only    Date/Time: 7/10/2021 11:05 AM  Performed by: Rosanne Lucia MD  Authorized by: Rosanne Lucia MD     Indications / Diagnosis:  Chest pain  Comments:      EKG demonstrates normal sinus rhythm no acute ST T wave abnormalities normal axis normal interval             ED Course                                           MDM  Number of Diagnoses or Management Options  Diagnosis management comments: Patient is monitored emergency department chest x-ray demonstrated no acute findings EKG no acute findings patient had initial troponin not elevated 3 hour follow-up not elevated  Had a long discussion with patient and his family at the bedside patient clearly has what appears to be delusional thought processes and hallucinations  However patient not suicidal homicidal has very good insight and does understand he does need behavior health assistance however does not want to be admitted to the hospitalist not willingly going to sign a 201 and he does not meet the criteria for 302   Have given the son phone number for [facility] he can reach out in the event the patient is doing things based on his thought processes that might be considered harmful to self or others  He does have a tentative appointment set up with a psychiatrist in the [facility] who is in his insurance plan within the next week or 2  I will start the patient on Zyprexa 10 mg daily until he can follow up with his new psychiatrist   Patient does not appear to have acute cardiac etiology for his symptoms today        Disposition  Final diagnoses:   Atypical chest pain   Delusional disorder ([address] )     Time reflects when diagnosis was documented in both MDM as applicable and the Disposition within this note     Time User Action Codes Description Comment    7/10/2021  3:53 PM Ronnie Aures Add [R07 89] Atypical chest pain     7/10/2021  3:53 PM Ronnie Aures Add [F22] Delusional disorder [facility])       ED Disposition     ED Disposition Condition Date/Time Comment    Discharge Stable Sat Jul 10, 2021  3:53 PM Kiki Castrejon discharge to home/self care  Follow-up Information     Follow up With Specialties Details Why Contact Info Additional Information    [facility] [address] [facility] In 1 week  [address] [facility] [address] [id number] [phone] [facility] [address] [facility], [address] [facility] [address], [place], [place], [address], [phone]          Patient's Medications   Discharge Prescriptions    OLANZAPINE (ZYPREXA) 10 MG TABLET    Take 1 tablet (10 mg total) by mouth daily at bedtime for 20 days       Start Date: 7/10/2021 End Date: 7/30/2021       Order Dose: 10 mg       Quantity: 20 tablet    Refills: 0     No discharge procedures on file      PDMP Review     None          ED Provider  Electronically Signed by           Corky Torres MD  07/10/21 4064

## 2021-07-10 NOTE — ED NOTES
Son out to nurses station, expresses concerns due to pt taking electrical outlets apart and spreading salt around the house  Spoke with Dr Sara Richard, he would be in to speak with them       Hawa Larios, RN  07/10/21 4330

## 2021-07-12 LAB
ATRIAL RATE: 74 BPM
ATRIAL RATE: 75 BPM
P AXIS: 145 DEGREES
P AXIS: 59 DEGREES
PR INTERVAL: 100 MS
PR INTERVAL: 107 MS
QRS AXIS: 146 DEGREES
QRS AXIS: 83 DEGREES
QRSD INTERVAL: 101 MS
QRSD INTERVAL: 95 MS
QT INTERVAL: 409 MS
QT INTERVAL: 411 MS
QTC INTERVAL: 456 MS
QTC INTERVAL: 457 MS
T WAVE AXIS: 145 DEGREES
T WAVE AXIS: 60 DEGREES
VENTRICULAR RATE: 74 BPM
VENTRICULAR RATE: 75 BPM

## 2021-07-12 PROCEDURE — 93010 ELECTROCARDIOGRAM REPORT: CPT | Performed by: INTERNAL MEDICINE

## 2022-09-17 ENCOUNTER — HOSPITAL ENCOUNTER (EMERGENCY)
Facility: HOSPITAL | Age: 57
Discharge: HOME | End: 2022-09-17
Attending: INTERNAL MEDICINE
Payer: COMMERCIAL

## 2022-09-17 ENCOUNTER — APPOINTMENT (OUTPATIENT)
Dept: RADIOLOGY | Facility: HOSPITAL | Age: 57
End: 2022-09-17
Payer: COMMERCIAL

## 2022-09-17 VITALS
OXYGEN SATURATION: 96 % | SYSTOLIC BLOOD PRESSURE: 120 MMHG | RESPIRATION RATE: 16 BRPM | DIASTOLIC BLOOD PRESSURE: 75 MMHG | HEART RATE: 68 BPM | TEMPERATURE: 98.4 F

## 2022-09-17 DIAGNOSIS — S65.512A LACERATION OF BLOOD VESSEL OF RIGHT MIDDLE FINGER, INITIAL ENCOUNTER: ICD-10-CM

## 2022-09-17 DIAGNOSIS — S61.210A LACERATION OF RIGHT INDEX FINGER WITHOUT FOREIGN BODY WITHOUT DAMAGE TO NAIL, INITIAL ENCOUNTER: Primary | ICD-10-CM

## 2022-09-17 PROCEDURE — 12002 RPR S/N/AX/GEN/TRNK2.6-7.5CM: CPT | Performed by: INTERNAL MEDICINE

## 2022-09-17 PROCEDURE — 90715 TDAP VACCINE 7 YRS/> IM: CPT | Performed by: INTERNAL MEDICINE

## 2022-09-17 PROCEDURE — 12041 INTMD RPR N-HF/GENIT 2.5CM/<: CPT | Performed by: INTERNAL MEDICINE

## 2022-09-17 PROCEDURE — 90471 IMMUNIZATION ADMIN: CPT

## 2022-09-17 PROCEDURE — 99284 EMERGENCY DEPT VISIT MOD MDM: CPT | Performed by: INTERNAL MEDICINE

## 2022-09-17 PROCEDURE — 96365 THER/PROPH/DIAG IV INF INIT: CPT

## 2022-09-17 PROCEDURE — 73140 X-RAY EXAM OF FINGER(S): CPT

## 2022-09-17 PROCEDURE — 99283 EMERGENCY DEPT VISIT LOW MDM: CPT

## 2022-09-17 RX ORDER — CEFAZOLIN SODIUM 1 G/50ML
1000 SOLUTION INTRAVENOUS ONCE
Status: COMPLETED | OUTPATIENT
Start: 2022-09-17 | End: 2022-09-17

## 2022-09-17 RX ORDER — GINSENG 100 MG
1 CAPSULE ORAL ONCE
Status: COMPLETED | OUTPATIENT
Start: 2022-09-17 | End: 2022-09-17

## 2022-09-17 RX ORDER — LIDOCAINE HYDROCHLORIDE 10 MG/ML
3 INJECTION, SOLUTION EPIDURAL; INFILTRATION; INTRACAUDAL; PERINEURAL ONCE
Status: COMPLETED | OUTPATIENT
Start: 2022-09-17 | End: 2022-09-17

## 2022-09-17 RX ORDER — CEPHALEXIN 500 MG/1
500 CAPSULE ORAL EVERY 6 HOURS SCHEDULED
Qty: 20 CAPSULE | Refills: 0 | Status: SHIPPED | OUTPATIENT
Start: 2022-09-17 | End: 2022-09-22

## 2022-09-17 RX ADMIN — TETANUS TOXOID, REDUCED DIPHTHERIA TOXOID AND ACELLULAR PERTUSSIS VACCINE, ADSORBED 0.5 ML: 5; 2.5; 8; 8; 2.5 SUSPENSION INTRAMUSCULAR at 09:15

## 2022-09-17 RX ADMIN — CEFAZOLIN SODIUM 1000 MG: 1 SOLUTION INTRAVENOUS at 09:13

## 2022-09-17 RX ADMIN — BACITRACIN ZINC 1 SMALL APPLICATION: 500 OINTMENT TOPICAL at 10:49

## 2022-09-17 RX ADMIN — LIDOCAINE HYDROCHLORIDE 3 ML: 10 INJECTION, SOLUTION EPIDURAL; INFILTRATION; INTRACAUDAL; PERINEURAL at 09:14

## 2022-09-17 NOTE — ED NOTES
Nonstick telfa dressing applied over rt hand lacerations with romero dressing     Korey Jackson RN  09/17/22 6650

## 2022-09-17 NOTE — ED PROVIDER NOTES
History  Chief Complaint   Patient presents with    Finger Laceration     Pt presents to ED from home after pt was sharpening knife, dropped knife, then went to catch knife w/ right hand causing lac to index and middle finger of right hand  Bleeding controlled w/ pressure  This is a 61years old came for having a laceration of the right 2nd and 3rd fingers  Patient stated that he was sharpening a knife and the knife dropped to the floor and when he pick it up he injured himself  Patient came with  Bleeding, and patient applied a dirty towel on the laceration area  Which helped to stop the bleeding but once you move the towel bleeding started again  Patient takes no anticoagulants  Patient does not remember last time he took tetanus shot  Patient has normal sensation  Patient has history of psychiatric disorder and he is on Zyprexa and Celexa  Patient has no other complaints  History provided by:  Patient   used: No    Laceration  Location:  Finger  Finger laceration location:  R index finger and R middle finger  Length:  2 cm on each finger  Depth: Through underlying tissue  Quality: jagged    Bleeding: venous and controlled    Time since incident:  30 minutes  Laceration mechanism:  Knife  Pain details:     Quality:  Sharp    Severity:  Moderate    Timing:  Constant    Progression:  Unchanged  Foreign body present:  No foreign bodies  Relieved by:  Nothing  Worsened by:  Nothing  Ineffective treatments:  None tried  Tetanus status:  Out of date  Associated symptoms: no fever, no focal weakness, no numbness, no rash, no redness, no swelling and no streaking        Prior to Admission Medications   Prescriptions Last Dose Informant Patient Reported? Taking?    Citalopram Hydrobromide (CELEXA PO)   Yes No   Sig: Take by mouth   OLANZapine (ZyPREXA) 10 mg tablet   No No   Sig: Take 1 tablet (10 mg total) by mouth daily at bedtime for 20 days      Facility-Administered Medications: None       Past Medical History:   Diagnosis Date    Depression        No past surgical history on file  No family history on file  I have reviewed and agree with the history as documented  E-Cigarette/Vaping    E-Cigarette Use Never User      E-Cigarette/Vaping Substances     Social History     Tobacco Use    Smoking status: Current Every Day Smoker     Packs/day: 0 25     Types: Cigarettes    Smokeless tobacco: Never Used   Vaping Use    Vaping Use: Never used   Substance Use Topics    Alcohol use: Not Currently    Drug use: Yes     Types: Marijuana       Review of Systems   Constitutional: Negative for diaphoresis, fatigue and fever  HENT: Negative for congestion  Respiratory: Negative for cough, chest tightness and shortness of breath  Cardiovascular: Negative for chest pain, palpitations and leg swelling  Gastrointestinal: Negative for abdominal pain, diarrhea, nausea and vomiting  Endocrine: Negative for polydipsia, polyphagia and polyuria  Genitourinary: Negative for difficulty urinating, dysuria, flank pain and hematuria  Musculoskeletal: Negative for arthralgias, back pain, gait problem, neck pain and neck stiffness  Skin: Positive for wound  Negative for color change, pallor and rash  Neurological: Negative for dizziness, focal weakness, light-headedness and headaches  Hematological: Negative for adenopathy  Does not bruise/bleed easily  Psychiatric/Behavioral: Negative for agitation and behavioral problems  Physical Exam  Physical Exam  Vitals and nursing note reviewed  Constitutional:       General: He is not in acute distress  Appearance: He is well-developed  He is not diaphoretic  HENT:      Head: Normocephalic and atraumatic  Mouth/Throat:      Pharynx: No oropharyngeal exudate  Cardiovascular:      Rate and Rhythm: Normal rate and regular rhythm  Heart sounds: Normal heart sounds  No murmur heard  No friction rub  Pulmonary:      Effort: Pulmonary effort is normal  No respiratory distress  Breath sounds: Normal breath sounds  No wheezing  Chest:      Chest wall: No tenderness  Abdominal:      General: Bowel sounds are normal  There is no distension  Palpations: Abdomen is soft  There is no mass  Tenderness: There is no abdominal tenderness  There is no guarding  Musculoskeletal:         General: Signs of injury present  No swelling, tenderness or deformity  Normal range of motion  Cervical back: Normal range of motion and neck supple  Right lower leg: No edema  Left lower leg: No edema  Comments: Examination of the right hand shows;  Jagged laceration at the volar aspect of proximal phalanges of the right second and 3rd fingers  Has very active pulsating bleeding  The sensation is intact  Capillary refill is less than 2 seconds  Neurovascular is intact  Motor is intact  Skin:     General: Skin is warm and dry  Neurological:      Mental Status: He is alert and oriented to person, place, and time     Psychiatric:         Behavior: Behavior normal          Vital Signs  ED Triage Vitals   Temperature Pulse Respirations Blood Pressure SpO2   09/17/22 0905 09/17/22 0905 09/17/22 0905 09/17/22 0905 09/17/22 0905   98 4 °F (36 9 °C) 67 16 148/77 93 %      Temp src Heart Rate Source Patient Position - Orthostatic VS BP Location FiO2 (%)   -- 09/17/22 1012 -- 09/17/22 1012 --    Monitor  Left arm       Pain Score       --                  Vitals:    09/17/22 0905 09/17/22 1012   BP: 148/77 120/75   Pulse: 67 68         Visual Acuity      ED Medications  Medications   tetanus-diphtheria-acellular pertussis (BOOSTRIX) IM injection 0 5 mL (0 5 mL Intramuscular Given 9/17/22 0915)   ceFAZolin (ANCEF) IVPB (premix in dextrose) 1,000 mg 50 mL (0 mg Intravenous Stopped 9/17/22 0945)   lidocaine (PF) (XYLOCAINE-MPF) 1 % injection 3 mL (3 mL Infiltration Given 9/17/22 0914)   bacitracin topical ointment 1 small application (1 small application Topical Given 9/17/22 1049)       Diagnostic Studies  Results Reviewed     None                 XR finger second digit-index RIGHT   Final Result by Klaus Godfrey MD (09/17 1101)      No acute osseous abnormality  Workstation performed: [id number]         XR finger third digit-middle RIGHT   Final Result by Klaus Godfrey MD (09/17 1101)      No acute osseous abnormality  Workstation performed: [id number]                    Procedures  Laceration repair    Date/Time: 9/17/2022 10:46 AM  Performed by: Santiago Eason MD  Authorized by: Santiago Eason MD   Consent: Verbal consent obtained  Consent given by: patient  Patient understanding: patient states understanding of the procedure being performed  Patient consent: the patient's understanding of the procedure matches consent given  Procedure consent: procedure consent matches procedure scheduled  Relevant documents: relevant documents present and verified  Test results: test results available and properly labeled  Radiology Images displayed and confirmed  If images not available, report reviewed: imaging studies available  Patient identity confirmed: verbally with patient, arm band, provided demographic data and hospital-assigned identification number  Location: R Index finger   Laceration length: 2 5 cm  Contamination: The wound is contaminated  Foreign bodies: no foreign bodies  Tendon involvement: none  Nerve involvement: none  Vascular damage: yes  Anesthesia: digital block    Anesthesia:  Local Anesthetic: lidocaine 1% without epinephrine  Anesthetic total: 2 mL    Sedation:  Patient sedated: no      Wound Dehiscence:    Secondary closure or dehiscence: complex    Procedure Details:  Preparation: Patient was prepped and draped in the usual sterile fashion    Irrigation solution: saline  Irrigation method: syringe  Amount of cleaning: extensive  Debridement: none  Degree of undermining: none  Skin closure: Ethilon  Subcutaneous closure: 4-0 Vicryl  Number of sutures: 6  Technique: simple  Approximation: close  Approximation difficulty: simple  Dressing: 4x4 sterile gauze and antibiotic ointment  Patient tolerance: patient tolerated the procedure well with no immediate complications  Comments: There were arterial bleeding which is stopped with Vicryl 4-0, 4 sutures inserted which eventually stopped bleeding  Laceration repair    Date/Time: 9/17/2022 10:52 AM  Performed by: Marni Ahn MD  Authorized by: Marni Ahn MD   Consent: Verbal consent obtained  Risks and benefits: risks, benefits and alternatives were discussed  Consent given by: patient  Patient understanding: patient states understanding of the procedure being performed  Patient consent: the patient's understanding of the procedure matches consent given  Procedure consent: procedure consent matches procedure scheduled  Relevant documents: relevant documents present and verified  Test results: test results available and properly labeled  Site marked: the operative site was marked  Radiology Images displayed and confirmed  If images not available, report reviewed: imaging studies available  Patient identity confirmed: verbally with patient, arm band, provided demographic data and hospital-assigned identification number  Location: R 3rd finger  Laceration length: 3 cm  Contamination: The wound is contaminated    Foreign bodies: no foreign bodies  Tendon involvement: none  Nerve involvement: none  Vascular damage: yes    Anesthesia:  Local Anesthetic: lidocaine 1% without epinephrine  Anesthetic total: 2 mL    Sedation:  Patient sedated: no      Wound Dehiscence:  Superficial Wound Dehiscence: simple closure      Procedure Details:  Irrigation solution: saline  Irrigation method: syringe  Amount of cleaning: standard  Debridement: none  Degree of undermining: none  Skin closure: Ethilon  Subcutaneous closure: 4-0 Vicryl  Number of sutures: 7  Technique: simple  Approximation: close  Approximation difficulty: simple  Dressing: 4x4 sterile gauze and antibiotic ointment  Patient tolerance: patient tolerated the procedure well with no immediate complications  Comments: The arterial bleeding stopped  Orthopedic injury treatment    Date/Time: 9/17/2022 10:56 AM  Performed by: Yuri Pineda MD  Authorized by: Yuri Pineda MD     Patient Location:  ED  Shiro Protocol:  Procedure performed by:  Consent: Verbal consent obtained  Consent given by: patient  Patient understanding: patient states understanding of the procedure being performed  Patient consent: the patient's understanding of the procedure matches consent given  Procedure consent: procedure consent matches procedure scheduled  Relevant documents: relevant documents present and verified  Test results: test results available and properly labeled  Site marked: the operative site was marked  Radiology Images displayed and confirmed  If images not available, report reviewed: imaging studies available  Patient identity confirmed: verbally with patient, arm band, provided demographic data, hospital-assigned identification number and anonymous protocol, patient vented/unresponsive      Injury location:  Hand  Location details:  Right hand  Injury type:   Soft tissue  Distal perfusion: normal    Neurological function: normal    Range of motion: normal    Local anesthesia used?: No    General anesthesia used?: No    Skeletal traction used?: No    Immobilization:  Splint  Splint type:  Short arm splint, static (forearm to hand)  Supplies used:  Elastic bandage  Neurovascular status: Neurovascularly intact    Distal perfusion: normal    Neurological function: normal    Range of motion: normal               ED Course                                             MDM  Number of Diagnoses or Management Options  Diagnosis management comments: THIS IS A 61YEARS OLD CAME FOR SUSTAINED LACERATION TO THE RIGHT 2ND AND 3RD FINGERS  As patient was sharpening a knife which dropped from him so he took it and during taking it it lacerate the volar aspect of the proximal phalanges of 2nd and 3rd fingers  Patient came with extensive bleeding  X-ray shows no foreign body no fractures  The laceration was sutured and the bleeding artery sutured with his Vacryl 4- 0 which is stop the bleeding  This scan was sutured was this alone for the issue 6 stitches inserted on the right index finger and 7 in stitches on the right middle finger  Patient tolerated procedure well  Patient instructed to come back to the emergency room 2 days to check the wound  Patient instructed follow-up with hand surgeon  Bulky dressing applied and a splint also applied  Patient is going to be discharged on antibiotics Keflex as the wound was dirty  Patient received tetanus shot at the ER  Disposition  Final diagnoses:   Laceration of right index finger without foreign body without damage to nail, initial encounter   Laceration of blood vessel of right middle finger, initial encounter     Time reflects when diagnosis was documented in both MDM as applicable and the Disposition within this note     Time User Action Codes Description Comment    9/17/2022 11:06 AM Mayra Camarillo Add [P76 [id number]] Laceration of right index finger without foreign body without damage to nail, initial encounter     9/17/2022 11:07 AM Mayra Camarillo Add [C71 [id number]] Laceration of blood vessel of right middle finger, initial encounter       ED Disposition     ED Disposition   Discharge    Condition   Stable    Date/Time   Sat Sep 17, 2022 11:15 AM    Comment   Ron Garibay discharge to home/self care                 Follow-up Information     Follow up With Specialties Details Why [address], MD Orthopedic Surgery, Hand Surgery In 3 days  [address] [facility]  Go in 2 days  [address] [id number]          Discharge Medication List as of 9/17/2022 11:15 AM      START taking these medications    Details   cephalexin (KEFLEX) 500 mg capsule Take 1 capsule (500 mg total) by mouth every 6 (six) hours for 5 days, Starting Sat 9/17/2022, Until Thu 9/22/2022, Normal         CONTINUE these medications which have NOT CHANGED    Details   Citalopram Hydrobromide (CELEXA PO) Take by mouth, Historical Med      OLANZapine (ZyPREXA) 10 mg tablet Take 1 tablet (10 mg total) by mouth daily at bedtime for 20 days, Starting Sat 7/10/2021, Until Fri 7/30/2021, Normal             No discharge procedures on file      PDMP Review     None          ED Provider  Electronically Signed by           Rodriguez Arvizu MD  09/17/22 1725

## 2022-09-17 NOTE — DISCHARGE INSTRUCTIONS
Take medications as prescribed  Follow up with hand surgeon dr Claudia Doss to er to check the wound in 2 days  Take tylenol for pain if needed  XR finger second digit-index RIGHT   Final Result      No acute osseous abnormality  Workstation performed: [id number]         XR finger third digit-middle RIGHT   Final Result      No acute osseous abnormality              Workstation performed: [id number]

## 2022-09-21 ENCOUNTER — TELEPHONE (OUTPATIENT)
Dept: OBGYN CLINIC | Facility: MEDICAL CENTER | Age: 57
End: 2022-09-21

## 2022-09-21 NOTE — TELEPHONE ENCOUNTER
Patient was in the ED for right hand for (FINGER RIGHT SECOND DIGIT-INDEX, XR FINGER RIGHT THIRD DIGIT-MIDDLE ) he is being referred to Dr Lety Dunlap  Sent to hand pool      Patient:  Matt Acuna    MRN: [id number]    Phone: Ann Marie Pickett is his ride, please call her for appointment, [phone]

## 2022-09-26 ENCOUNTER — OFFICE VISIT (OUTPATIENT)
Dept: OBGYN CLINIC | Facility: MEDICAL CENTER | Age: 57
End: 2022-09-26
Payer: COMMERCIAL

## 2022-09-26 VITALS
WEIGHT: 125 LBS | HEIGHT: 66 IN | HEART RATE: 61 BPM | BODY MASS INDEX: 20.09 KG/M2 | SYSTOLIC BLOOD PRESSURE: 120 MMHG | DIASTOLIC BLOOD PRESSURE: 80 MMHG

## 2022-09-26 DIAGNOSIS — S56.129A FLEXOR TENDON LACERATION OF FINGER WITH OPEN WOUND, INITIAL ENCOUNTER: Primary | ICD-10-CM

## 2022-09-26 DIAGNOSIS — S64.40XA LACERATION OF DIGITAL NERVE OF FINGER, INITIAL ENCOUNTER: ICD-10-CM

## 2022-09-26 DIAGNOSIS — S61.209A FLEXOR TENDON LACERATION OF FINGER WITH OPEN WOUND, INITIAL ENCOUNTER: Primary | ICD-10-CM

## 2022-09-26 PROCEDURE — 99203 OFFICE O/P NEW LOW 30 MIN: CPT | Performed by: SURGERY

## 2022-09-26 RX ORDER — CHLORHEXIDINE GLUCONATE 0.12 MG/ML
15 RINSE ORAL ONCE
Status: CANCELLED | OUTPATIENT
Start: 2022-10-12 | End: 2022-09-26

## 2022-09-26 RX ORDER — ALBUTEROL SULFATE 90 UG/1
2 AEROSOL, METERED RESPIRATORY (INHALATION) EVERY 6 HOURS PRN
COMMUNITY
Start: 2022-06-27

## 2022-09-26 RX ORDER — FLUOXETINE 10 MG/1
10 CAPSULE ORAL DAILY
COMMUNITY
Start: 2022-09-20

## 2022-09-26 RX ORDER — FLUOXETINE HYDROCHLORIDE 20 MG/1
20 CAPSULE ORAL DAILY
COMMUNITY
Start: 2022-08-17

## 2022-09-26 NOTE — H&P (VIEW-ONLY)
ORTHOPAEDIC HAND, WRIST, AND ELBOW OFFICE  VISIT       ASSESSMENT/PLAN:      61 y o  male presents today for laceration of right Index and Middle finger with Index FDP laceration    Diagnostics reviewed and physical exam performed  Diagnosis, treatment options and associated risks were discussed with the patient including no treatment, nonsurgical treatment and potential for surgical intervention  The patient was given the opportunity to ask questions regarding each  The patient verbalized understanding of exam findings and treatment plan  We engaged in the shared decision-making process and treatment options were discussed at length with the patient  Surgical and conservative management discussed today along with risks and benefits  The patient can shower letting soapy water over incision, yet should not to submerge the incision, and then pat dry  Lacerations healing  Minimal drainage from middle finger due to sutures removed  No signs of infection  Maceration noted Middle fingers laceration    Diagnoses and all orders for this visit:    Flexor tendon laceration of finger with open wound, initial encounter  -     Case request operating room: REPAIR TENDON FINGER/HAND  CPT:  48753 (in zone 2), DISSECTION/REPAIR NERVE  CPT 60038; Standing  -     CBC and Platelet; Future  -     Basic metabolic panel; Future  -     EKG 12 lead; Future  -     Case request operating room: REPAIR TENDON FINGER/HAND  CPT:  56700 (in zone 2), DISSECTION/REPAIR NERVE  CPT 28625    Laceration of digital nerve of finger, initial encounter  -     Case request operating room: REPAIR TENDON FINGER/HAND  CPT:  53563 (in zone 2), DISSECTION/REPAIR NERVE  CPT 04274; Standing  -     CBC and Platelet; Future  -     Basic metabolic panel; Future  -     EKG 12 lead;  Future  -     Case request operating room: REPAIR TENDON FINGER/HAND  CPT:  44752 (in zone 2), DISSECTION/REPAIR NERVE  CPT 51195    Other orders  -     albuterol (PROVENTIL HFA,VENTOLIN HFA) 90 mcg/act inhaler; Inhale 2 puffs every 6 (six) hours as needed  -     FLUoxetine (PROzac) 10 mg capsule; Take 10 mg by mouth daily Takes 30mg total  -     FLUoxetine (PROzac) 20 mg capsule; Take 20 mg by mouth daily  -     Nursing Communication Warmimg Interventions Implemented; Standing  -     Nursing Communication CHG bath, have staff wash entire body (neck down) per pre-op bathing protocol  Routine, evening prior to, and day of surgery ; Standing  -     chlorhexidine (PERIDEX) 0 12 % oral rinse 15 mL  -     Void on call to OR; Standing  -     Insert peripheral IV; Standing  -     Diet NPO; Sips with meds; Standing        Follow Up:  Return for Post-Op  To Do Next Visit:  Re-evaluation of current issue and Sutures out        ____________________________________________________________________________________________________________________________________________      CHIEF COMPLAINT:  Chief Complaint   Patient presents with   • Right Hand - Pain     Lacerations       SUBJECTIVE:  Shona Arias is a 61y o  year old RHD male who presents today for evaluation of laceration to right hand middle and index fingers  DOI 9/17/2022    Patient stated that he stabbed a couch out of frustration and his hand slid up the blade  Pt states he went to the ED and his wounds were irrigated and sutured  Pt states today he took his middle finger stiches due to irritation  I have personally reviewed all the relevant PMH, PSH, SH, FH, Medications and allergies      PAST MEDICAL HISTORY:  Past Medical History:   Diagnosis Date   • Anxiety    • Asthma     allergy related   • Depression    • Seizures ([facility] [address] ) 2017       PAST SURGICAL HISTORY:  Past Surgical History:   Procedure Laterality Date   • ABDOMINAL SURGERY     • APPENDECTOMY     • BACK SURGERY      L5   • COLONOSCOPY     • EXPLORATORY LAPAROTOMY     • NECK SURGERY      with titanium william and limited ROM       FAMILY HISTORY:  History reviewed   No pertinent family history  SOCIAL HISTORY:  Social History     Tobacco Use   • Smoking status: Current Every Day Smoker     Packs/day: 0 25     Types: Cigarettes   • Smokeless tobacco: Never Used   Vaping Use   • Vaping Use: Never used   Substance Use Topics   • Alcohol use: Not Currently   • Drug use: Yes     Frequency: 4 0 times per week     Types: Marijuana     Comment: hx substance abuse-cocaine       MEDICATIONS:    Current Outpatient Medications:   •  albuterol (PROVENTIL HFA,VENTOLIN HFA) 90 mcg/act inhaler, Inhale 2 puffs every 6 (six) hours as needed, Disp: , Rfl:   •  Citalopram Hydrobromide (CELEXA PO), Take by mouth, Disp: , Rfl:   •  FLUoxetine (PROzac) 10 mg capsule, Take 10 mg by mouth daily Takes 30mg total, Disp: , Rfl:   •  FLUoxetine (PROzac) 20 mg capsule, Take 20 mg by mouth daily, Disp: , Rfl:   •  multivitamin (THERAGRAN) TABS, Take 1 tablet by mouth daily, Disp: , Rfl:   •  OLANZapine (ZyPREXA) 10 mg tablet, Take 1 tablet (10 mg total) by mouth daily at bedtime for 20 days, Disp: 20 tablet, Rfl: 0    ALLERGIES:  Allergies   Allergen Reactions   • Hydrocodone-Acetaminophen Rash   • Milk-Related Compounds - Food Allergy Rash           REVIEW OF SYSTEMS:  Review of Systems   Constitutional: Negative for chills and fever  HENT: Negative for ear pain and sore throat  Eyes: Negative for pain and visual disturbance  Respiratory: Negative for cough and shortness of breath  Cardiovascular: Negative for chest pain and palpitations  Gastrointestinal: Negative for abdominal pain and vomiting  Genitourinary: Negative for dysuria and hematuria  Musculoskeletal: Negative for arthralgias and back pain  Skin: Positive for wound  Negative for color change and rash  Neurological: Negative for seizures and syncope  All other systems reviewed and are negative        VITALS:  Vitals:    09/26/22 1435   BP: 120/80   Pulse: 61       LABS:  HgA1c: No results found for: HGBA1C  BMP:   Lab Results   Component Value Date    GLUCOSE 80 11/18/2014    CALCIUM 8 4 07/10/2021     11/18/2014    K 3 8 07/10/2021    CO2 30 07/10/2021     07/10/2021    BUN 9 07/10/2021    CREATININE 0 69 07/10/2021       _____________________________________________________  PHYSICAL EXAMINATION:  General: well developed and well nourished, alert, oriented times 3 and appears comfortable  Psychiatric: Normal  HEENT: Normocephalic, Atraumatic Trachea Midline, No torticollis  Pulmonary: No audible wheezing or respiratory distress   Abdomen/GI: Non tender, non distended   Cardiovascular: No pitting edema, 2+ radial pulse   Skin: No Masses, No Erythema, Laceration Distal Middle and Index finger, No Fluctuation, No Ulcerations  Neurovascular: Sensation Intact to the Median, Ulnar, Radial Nerve, Motor Intact to the Median, Ulnar, Radial Nerve and Pulses Intact  Musculoskeletal: Normal, except as noted in detailed exam and in HPI        MUSCULOSKELETAL EXAMINATION:    Right Index finger FDP lacerated as well as Radial digital nerve with decreased sensation along volar radial side of the index finger (he has baseline sensory loss in the tip of his index and middle fingers from a saw injury in the past)  ___________________________________________________  STUDIES REVIEWED:  No studies reviewed          PROCEDURES PERFORMED:  Procedures  No Procedures performed today    _____________________________________________________      Bin Cash    I,:  Fede Araujo am acting as a scribe while in the presence of the attending physician :       I,:  Vanessa Akers MD personally performed the services described in this documentation    as scribed in my presence :

## 2022-09-26 NOTE — PROGRESS NOTES
ORTHOPAEDIC HAND, WRIST, AND ELBOW OFFICE  VISIT       ASSESSMENT/PLAN:      61 y o  male presents today for laceration of right Index and Middle finger with Index FDP laceration    Diagnostics reviewed and physical exam performed  Diagnosis, treatment options and associated risks were discussed with the patient including no treatment, nonsurgical treatment and potential for surgical intervention  The patient was given the opportunity to ask questions regarding each  The patient verbalized understanding of exam findings and treatment plan  We engaged in the shared decision-making process and treatment options were discussed at length with the patient  Surgical and conservative management discussed today along with risks and benefits  The patient can shower letting soapy water over incision, yet should not to submerge the incision, and then pat dry  Lacerations healing  Minimal drainage from middle finger due to sutures removed  No signs of infection  Maceration noted Middle fingers laceration    Diagnoses and all orders for this visit:    Flexor tendon laceration of finger with open wound, initial encounter  -     Case request operating room: REPAIR TENDON FINGER/HAND  CPT:  79387 (in zone 2), DISSECTION/REPAIR NERVE  CPT 11415; Standing  -     CBC and Platelet; Future  -     Basic metabolic panel; Future  -     EKG 12 lead; Future  -     Case request operating room: REPAIR TENDON FINGER/HAND  CPT:  00877 (in zone 2), DISSECTION/REPAIR NERVE  CPT 42740    Laceration of digital nerve of finger, initial encounter  -     Case request operating room: REPAIR TENDON FINGER/HAND  CPT:  24581 (in zone 2), DISSECTION/REPAIR NERVE  CPT 93451; Standing  -     CBC and Platelet; Future  -     Basic metabolic panel; Future  -     EKG 12 lead;  Future  -     Case request operating room: REPAIR TENDON FINGER/HAND  CPT:  68005 (in zone 2), DISSECTION/REPAIR NERVE  CPT 00164    Other orders  -     albuterol (PROVENTIL HFA,VENTOLIN HFA) 90 mcg/act inhaler; Inhale 2 puffs every 6 (six) hours as needed  -     FLUoxetine (PROzac) 10 mg capsule; Take 10 mg by mouth daily Takes 30mg total  -     FLUoxetine (PROzac) 20 mg capsule; Take 20 mg by mouth daily  -     Nursing Communication Warmimg Interventions Implemented; Standing  -     Nursing Communication CHG bath, have staff wash entire body (neck down) per pre-op bathing protocol  Routine, evening prior to, and day of surgery ; Standing  -     chlorhexidine (PERIDEX) 0 12 % oral rinse 15 mL  -     Void on call to OR; Standing  -     Insert peripheral IV; Standing  -     Diet NPO; Sips with meds; Standing        Follow Up:  Return for Post-Op  To Do Next Visit:  Re-evaluation of current issue and Sutures out        ____________________________________________________________________________________________________________________________________________      CHIEF COMPLAINT:  Chief Complaint   Patient presents with    Right Hand - Pain     Lacerations       SUBJECTIVE:  Willy Granger is a 61y o  year old RHD male who presents today for evaluation of laceration to right hand middle and index fingers  DOI 9/17/2022    Patient stated that he stabbed a couch out of frustration and his hand slid up the blade  Pt states he went to the ED and his wounds were irrigated and sutured  Pt states today he took his middle finger stiches due to irritation  I have personally reviewed all the relevant PMH, PSH, SH, FH, Medications and allergies      PAST MEDICAL HISTORY:  Past Medical History:   Diagnosis Date    Anxiety     Asthma     allergy related    Depression     Seizures ([facility] [address] ) 2017       PAST SURGICAL HISTORY:  Past Surgical History:   Procedure Laterality Date    ABDOMINAL SURGERY      APPENDECTOMY      BACK SURGERY      L5    COLONOSCOPY      EXPLORATORY LAPAROTOMY      NECK SURGERY      with titanium william and limited ROM       FAMILY HISTORY:  History reviewed   No pertinent family history  SOCIAL HISTORY:  Social History     Tobacco Use    Smoking status: Current Every Day Smoker     Packs/day: 0 25     Types: Cigarettes    Smokeless tobacco: Never Used   Vaping Use    Vaping Use: Never used   Substance Use Topics    Alcohol use: Not Currently    Drug use: Yes     Frequency: 4 0 times per week     Types: Marijuana     Comment: hx substance abuse-cocaine       MEDICATIONS:    Current Outpatient Medications:     albuterol (PROVENTIL HFA,VENTOLIN HFA) 90 mcg/act inhaler, Inhale 2 puffs every 6 (six) hours as needed, Disp: , Rfl:     Citalopram Hydrobromide (CELEXA PO), Take by mouth, Disp: , Rfl:     FLUoxetine (PROzac) 10 mg capsule, Take 10 mg by mouth daily Takes 30mg total, Disp: , Rfl:     FLUoxetine (PROzac) 20 mg capsule, Take 20 mg by mouth daily, Disp: , Rfl:     multivitamin (THERAGRAN) TABS, Take 1 tablet by mouth daily, Disp: , Rfl:     OLANZapine (ZyPREXA) 10 mg tablet, Take 1 tablet (10 mg total) by mouth daily at bedtime for 20 days, Disp: 20 tablet, Rfl: 0    ALLERGIES:  Allergies   Allergen Reactions    Hydrocodone-Acetaminophen Rash    Milk-Related Compounds - Food Allergy Rash           REVIEW OF SYSTEMS:  Review of Systems   Constitutional: Negative for chills and fever  HENT: Negative for ear pain and sore throat  Eyes: Negative for pain and visual disturbance  Respiratory: Negative for cough and shortness of breath  Cardiovascular: Negative for chest pain and palpitations  Gastrointestinal: Negative for abdominal pain and vomiting  Genitourinary: Negative for dysuria and hematuria  Musculoskeletal: Negative for arthralgias and back pain  Skin: Positive for wound  Negative for color change and rash  Neurological: Negative for seizures and syncope  All other systems reviewed and are negative        VITALS:  Vitals:    09/26/22 1435   BP: 120/80   Pulse: 61       LABS:  HgA1c: No results found for: HGBA1C  BMP:   Lab Results   Component Value Date    GLUCOSE 80 11/18/2014    CALCIUM 8 4 07/10/2021     11/18/2014    K 3 8 07/10/2021    CO2 30 07/10/2021     07/10/2021    BUN 9 07/10/2021    CREATININE 0 69 07/10/2021       _____________________________________________________  PHYSICAL EXAMINATION:  General: well developed and well nourished, alert, oriented times 3 and appears comfortable  Psychiatric: Normal  HEENT: Normocephalic, Atraumatic Trachea Midline, No torticollis  Pulmonary: No audible wheezing or respiratory distress   Abdomen/GI: Non tender, non distended   Cardiovascular: No pitting edema, 2+ radial pulse   Skin: No Masses, No Erythema, Laceration Distal Middle and Index finger, No Fluctuation, No Ulcerations  Neurovascular: Sensation Intact to the Median, Ulnar, Radial Nerve, Motor Intact to the Median, Ulnar, Radial Nerve and Pulses Intact  Musculoskeletal: Normal, except as noted in detailed exam and in HPI        MUSCULOSKELETAL EXAMINATION:    Right Index finger FDP lacerated as well as Radial digital nerve with decreased sensation along volar radial side of the index finger (he has baseline sensory loss in the tip of his index and middle fingers from a saw injury in the past)  ___________________________________________________  STUDIES REVIEWED:  No studies reviewed          PROCEDURES PERFORMED:  Procedures  No Procedures performed today    _____________________________________________________      Gerry Cristian    I,:  Cherrie Charles am acting as a scribe while in the presence of the attending physician :       I,:  Hortencia Vela MD personally performed the services described in this documentation    as scribed in my presence :

## 2022-09-30 ENCOUNTER — TELEPHONE (OUTPATIENT)
Dept: OBGYN CLINIC | Facility: MEDICAL CENTER | Age: 57
End: 2022-09-30

## 2022-09-30 NOTE — TELEPHONE ENCOUNTER
Patient is scheduled for surgery on 10/12  I called patient to let him know that the doctor entered labwork and a EKG that needs to be done prior to surgery  No answer, I did leave a VM with all the information and with my direct p# if he has any questions

## 2022-10-06 RX ORDER — DIPHENOXYLATE HYDROCHLORIDE AND ATROPINE SULFATE 2.5; .025 MG/1; MG/1
1 TABLET ORAL DAILY
COMMUNITY

## 2022-10-06 NOTE — PRE-PROCEDURE INSTRUCTIONS
Pre-Surgery Instructions:   Medication Instructions   • albuterol (PROVENTIL HFA,VENTOLIN HFA) 90 mcg/act inhaler Uses PRN- OK to take day of surgery   • FLUoxetine (PROzac) 10 mg capsule Take day of surgery  • FLUoxetine (PROzac) 20 mg capsule Take day of surgery  • multivitamin (THERAGRAN) TABS stop 10/6   • OLANZapine (ZyPREXA) 10 mg tablet Take night before surgery   Covid screening negative as per patient  Reviewed pre op medicine and showering instructions with patient via phone call, verbalizes understanding  Advised patient to stop taking non prescribed vitamins, herbal meds and NSAID's as of 10/6  Advised may take Tylenol products if needed  Advised to take DOS medicine with a small sip water  Advised NPO after MN prior to surgery and surgical services will call (10/11) with scheduled time of hospital arrival       Advised to abstain from smoking cigarettes/marijuana 24 hrs pre op  Pt  not interested in smoking cessation education  Pt verbalized understanding of all instructions

## 2022-10-10 ENCOUNTER — APPOINTMENT (OUTPATIENT)
Dept: LAB | Facility: HOSPITAL | Age: 57
End: 2022-10-10
Attending: SURGERY
Payer: COMMERCIAL

## 2022-10-10 ENCOUNTER — OFFICE VISIT (OUTPATIENT)
Dept: LAB | Facility: HOSPITAL | Age: 57
End: 2022-10-10
Attending: SURGERY
Payer: COMMERCIAL

## 2022-10-10 DIAGNOSIS — S56.129A FLEXOR TENDON LACERATION OF FINGER WITH OPEN WOUND, INITIAL ENCOUNTER: ICD-10-CM

## 2022-10-10 DIAGNOSIS — S64.40XA LACERATION OF DIGITAL NERVE OF FINGER, INITIAL ENCOUNTER: ICD-10-CM

## 2022-10-10 DIAGNOSIS — S61.209A FLEXOR TENDON LACERATION OF FINGER WITH OPEN WOUND, INITIAL ENCOUNTER: ICD-10-CM

## 2022-10-10 LAB
ANION GAP SERPL CALCULATED.3IONS-SCNC: 9 MMOL/L (ref 4–13)
BUN SERPL-MCNC: 9 MG/DL (ref 5–25)
CALCIUM SERPL-MCNC: 9 MG/DL (ref 8.4–10.2)
CHLORIDE SERPL-SCNC: 105 MMOL/L (ref 96–108)
CO2 SERPL-SCNC: 29 MMOL/L (ref 21–32)
CREAT SERPL-MCNC: 0.73 MG/DL (ref 0.6–1.3)
ERYTHROCYTE [DISTWIDTH] IN BLOOD BY AUTOMATED COUNT: 13.6 % (ref 11.6–15.1)
GFR SERPL CREATININE-BSD FRML MDRD: 102 ML/MIN/1.73SQ M
GLUCOSE P FAST SERPL-MCNC: 97 MG/DL (ref 65–99)
HCT VFR BLD AUTO: 43.2 % (ref 36.5–49.3)
HGB BLD-MCNC: 14.6 G/DL (ref 12–17)
MCH RBC QN AUTO: 30.9 PG (ref 26.8–34.3)
MCHC RBC AUTO-ENTMCNC: 33.8 G/DL (ref 31.4–37.4)
MCV RBC AUTO: 92 FL (ref 82–98)
PLATELET # BLD AUTO: 245 THOUSANDS/UL (ref 149–390)
PMV BLD AUTO: 11.6 FL (ref 8.9–12.7)
POTASSIUM SERPL-SCNC: 3.4 MMOL/L (ref 3.5–5.3)
RBC # BLD AUTO: 4.72 MILLION/UL (ref 3.88–5.62)
SODIUM SERPL-SCNC: 143 MMOL/L (ref 135–147)
WBC # BLD AUTO: 6.78 THOUSAND/UL (ref 4.31–10.16)

## 2022-10-10 PROCEDURE — 93005 ELECTROCARDIOGRAM TRACING: CPT

## 2022-10-10 PROCEDURE — 36415 COLL VENOUS BLD VENIPUNCTURE: CPT

## 2022-10-10 PROCEDURE — 85027 COMPLETE CBC AUTOMATED: CPT

## 2022-10-10 PROCEDURE — 80048 BASIC METABOLIC PNL TOTAL CA: CPT

## 2022-10-11 LAB
ATRIAL RATE: 71 BPM
P AXIS: 47 DEGREES
PR INTERVAL: 122 MS
QRS AXIS: 80 DEGREES
QRSD INTERVAL: 98 MS
QT INTERVAL: 414 MS
QTC INTERVAL: 449 MS
T WAVE AXIS: 57 DEGREES
VENTRICULAR RATE: 71 BPM

## 2022-10-11 PROCEDURE — 93010 ELECTROCARDIOGRAM REPORT: CPT | Performed by: INTERNAL MEDICINE

## 2022-10-12 ENCOUNTER — ANESTHESIA (OUTPATIENT)
Dept: PERIOP | Facility: HOSPITAL | Age: 57
End: 2022-10-12
Payer: COMMERCIAL

## 2022-10-12 ENCOUNTER — ANESTHESIA EVENT (OUTPATIENT)
Dept: PERIOP | Facility: HOSPITAL | Age: 57
End: 2022-10-12
Payer: COMMERCIAL

## 2022-10-12 ENCOUNTER — HOSPITAL ENCOUNTER (OUTPATIENT)
Facility: HOSPITAL | Age: 57
Setting detail: OUTPATIENT SURGERY
Discharge: HOME | End: 2022-10-12
Attending: SURGERY | Admitting: SURGERY
Payer: COMMERCIAL

## 2022-10-12 VITALS
BODY MASS INDEX: 20.09 KG/M2 | OXYGEN SATURATION: 94 % | SYSTOLIC BLOOD PRESSURE: 115 MMHG | RESPIRATION RATE: 16 BRPM | WEIGHT: 125 LBS | TEMPERATURE: 97.6 F | DIASTOLIC BLOOD PRESSURE: 60 MMHG | HEIGHT: 66 IN | HEART RATE: 69 BPM

## 2022-10-12 DIAGNOSIS — Z48.89 AFTERCARE FOLLOWING SURGERY: Primary | ICD-10-CM

## 2022-10-12 PROBLEM — J45.909 ASTHMA: Status: ACTIVE | Noted: 2022-10-12

## 2022-10-12 PROBLEM — F17.200 SMOKING: Status: ACTIVE | Noted: 2022-10-12

## 2022-10-12 PROBLEM — F32.A DEPRESSION: Status: ACTIVE | Noted: 2022-10-12

## 2022-10-12 PROBLEM — F41.9 ANXIETY: Status: ACTIVE | Noted: 2022-10-12

## 2022-10-12 PROCEDURE — 64831 REPAIR OF DIGIT NERVE: CPT | Performed by: SURGERY

## 2022-10-12 PROCEDURE — 26356 REPAIR FINGER/HAND TENDON: CPT | Performed by: SURGERY

## 2022-10-12 RX ORDER — MAGNESIUM HYDROXIDE 1200 MG/15ML
LIQUID ORAL AS NEEDED
Status: DISCONTINUED | OUTPATIENT
Start: 2022-10-12 | End: 2022-10-12 | Stop reason: HOSPADM

## 2022-10-12 RX ORDER — ACETAMINOPHEN 325 MG/1
650 TABLET ORAL EVERY 6 HOURS PRN
Status: DISCONTINUED | OUTPATIENT
Start: 2022-10-12 | End: 2022-10-12 | Stop reason: HOSPADM

## 2022-10-12 RX ORDER — FENTANYL CITRATE 50 UG/ML
INJECTION, SOLUTION INTRAMUSCULAR; INTRAVENOUS AS NEEDED
Status: DISCONTINUED | OUTPATIENT
Start: 2022-10-12 | End: 2022-10-12

## 2022-10-12 RX ORDER — MIDAZOLAM HYDROCHLORIDE 2 MG/2ML
INJECTION, SOLUTION INTRAMUSCULAR; INTRAVENOUS AS NEEDED
Status: DISCONTINUED | OUTPATIENT
Start: 2022-10-12 | End: 2022-10-12

## 2022-10-12 RX ORDER — ONDANSETRON 2 MG/ML
4 INJECTION INTRAMUSCULAR; INTRAVENOUS ONCE AS NEEDED
Status: DISCONTINUED | OUTPATIENT
Start: 2022-10-12 | End: 2022-10-12 | Stop reason: HOSPADM

## 2022-10-12 RX ORDER — OXYCODONE HYDROCHLORIDE 5 MG/1
5 TABLET ORAL EVERY 4 HOURS PRN
Status: DISCONTINUED | OUTPATIENT
Start: 2022-10-12 | End: 2022-10-12 | Stop reason: HOSPADM

## 2022-10-12 RX ORDER — PROPOFOL 10 MG/ML
INJECTION, EMULSION INTRAVENOUS AS NEEDED
Status: DISCONTINUED | OUTPATIENT
Start: 2022-10-12 | End: 2022-10-12

## 2022-10-12 RX ORDER — BUPIVACAINE HYDROCHLORIDE 2.5 MG/ML
INJECTION, SOLUTION EPIDURAL; INFILTRATION; INTRACAUDAL AS NEEDED
Status: DISCONTINUED | OUTPATIENT
Start: 2022-10-12 | End: 2022-10-12 | Stop reason: HOSPADM

## 2022-10-12 RX ORDER — NAPROXEN 500 MG/1
500 TABLET ORAL 2 TIMES DAILY WITH MEALS
Qty: 20 TABLET | Refills: 0 | Status: SHIPPED | OUTPATIENT
Start: 2022-10-12 | End: 2022-10-18 | Stop reason: SDUPTHER

## 2022-10-12 RX ORDER — SODIUM CHLORIDE, SODIUM LACTATE, POTASSIUM CHLORIDE, CALCIUM CHLORIDE 600; 310; 30; 20 MG/100ML; MG/100ML; MG/100ML; MG/100ML
INJECTION, SOLUTION INTRAVENOUS CONTINUOUS PRN
Status: DISCONTINUED | OUTPATIENT
Start: 2022-10-12 | End: 2022-10-12

## 2022-10-12 RX ORDER — ONDANSETRON 2 MG/ML
INJECTION INTRAMUSCULAR; INTRAVENOUS AS NEEDED
Status: DISCONTINUED | OUTPATIENT
Start: 2022-10-12 | End: 2022-10-12

## 2022-10-12 RX ORDER — LIDOCAINE HYDROCHLORIDE 10 MG/ML
INJECTION, SOLUTION EPIDURAL; INFILTRATION; INTRACAUDAL; PERINEURAL AS NEEDED
Status: DISCONTINUED | OUTPATIENT
Start: 2022-10-12 | End: 2022-10-12

## 2022-10-12 RX ORDER — FENTANYL CITRATE/PF 50 MCG/ML
50 SYRINGE (ML) INJECTION
Status: DISCONTINUED | OUTPATIENT
Start: 2022-10-12 | End: 2022-10-12 | Stop reason: HOSPADM

## 2022-10-12 RX ORDER — DEXAMETHASONE SODIUM PHOSPHATE 10 MG/ML
INJECTION, SOLUTION INTRAMUSCULAR; INTRAVENOUS AS NEEDED
Status: DISCONTINUED | OUTPATIENT
Start: 2022-10-12 | End: 2022-10-12

## 2022-10-12 RX ORDER — CHLORHEXIDINE GLUCONATE 0.12 MG/ML
15 RINSE ORAL ONCE
Status: COMPLETED | OUTPATIENT
Start: 2022-10-12 | End: 2022-10-12

## 2022-10-12 RX ADMIN — CHLORHEXIDINE GLUCONATE 0.12% ORAL RINSE 15 ML: 1.2 LIQUID ORAL at 09:50

## 2022-10-12 RX ADMIN — DEXAMETHASONE SODIUM PHOSPHATE 10 MG: 10 INJECTION, SOLUTION INTRAMUSCULAR; INTRAVENOUS at 12:24

## 2022-10-12 RX ADMIN — FENTANYL CITRATE 25 MCG: 50 INJECTION, SOLUTION INTRAMUSCULAR; INTRAVENOUS at 13:07

## 2022-10-12 RX ADMIN — FENTANYL CITRATE 25 MCG: 50 INJECTION, SOLUTION INTRAMUSCULAR; INTRAVENOUS at 13:39

## 2022-10-12 RX ADMIN — FENTANYL CITRATE 50 MCG: 50 INJECTION, SOLUTION INTRAMUSCULAR; INTRAVENOUS at 14:20

## 2022-10-12 RX ADMIN — PROPOFOL 200 MG: 10 INJECTION, EMULSION INTRAVENOUS at 12:24

## 2022-10-12 RX ADMIN — LIDOCAINE HYDROCHLORIDE 50 MG: 10 INJECTION, SOLUTION EPIDURAL; INFILTRATION; INTRACAUDAL; PERINEURAL at 12:24

## 2022-10-12 RX ADMIN — ONDANSETRON 4 MG: 2 INJECTION INTRAMUSCULAR; INTRAVENOUS at 13:33

## 2022-10-12 RX ADMIN — SODIUM CHLORIDE, SODIUM LACTATE, POTASSIUM CHLORIDE, AND CALCIUM CHLORIDE: .6; .31; .03; .02 INJECTION, SOLUTION INTRAVENOUS at 12:10

## 2022-10-12 RX ADMIN — MIDAZOLAM 2 MG: 1 INJECTION INTRAMUSCULAR; INTRAVENOUS at 12:20

## 2022-10-12 RX ADMIN — FENTANYL CITRATE 50 MCG: 50 INJECTION, SOLUTION INTRAMUSCULAR; INTRAVENOUS at 14:15

## 2022-10-12 RX ADMIN — OXYCODONE HYDROCHLORIDE 5 MG: 5 TABLET ORAL at 15:54

## 2022-10-12 RX ADMIN — ACETAMINOPHEN 650 MG: 325 TABLET, FILM COATED ORAL at 15:05

## 2022-10-12 RX ADMIN — FENTANYL CITRATE 50 MCG: 50 INJECTION, SOLUTION INTRAMUSCULAR; INTRAVENOUS at 12:44

## 2022-10-12 NOTE — NURSING NOTE
Ambulated to the bathroom without distress  Voided  Dressing/spint and ace wrap dry and intact  Fingers warm to touch  Brisk capillary refill

## 2022-10-12 NOTE — ANESTHESIA POSTPROCEDURE EVALUATION
Post-Op Assessment Note    CV Status:  Stable    Pain management: inadequate     Mental Status:  Alert and awake   Hydration Status:  Euvolemic   PONV Controlled:  Controlled   Airway Patency:  Patent      Post Op Vitals Reviewed: Yes      Staff: CRNA, Anesthesiologist         No complications documented      /95 (10/12/22 1405)    Temp     Pulse 80 (10/12/22 1405)   Resp 16 (10/12/22 1405)    SpO2 99 % (10/12/22 1405)

## 2022-10-12 NOTE — ANESTHESIA PREPROCEDURE EVALUATION
Procedure:  REPAIR TENDON FINGER/HAND CPT:  82602 (in zone 2) (Right Finger)  DISSECTION/REPAIR NERVE (Right Finger)    Relevant Problems   NEURO/PSYCH   (+) Anxiety   (+) Depression      PULMONARY   (+) Asthma   (+) Smoking        Physical Exam    Airway  Comment: Limited by cervical fusion  Mallampati score: I  TM Distance: >3 FB  Neck ROM: limited     Dental   Comment: Poor Dentition,     Cardiovascular      Pulmonary      Other Findings        Anesthesia Plan  ASA Score- 2     Anesthesia Type- general with ASA Monitors  Additional Monitors:   Airway Plan: LMA  Plan Factors-Exercise tolerance (METS): >4 METS  Chart reviewed  Patient summary reviewed  Patient is a current smoker  Patient did not smoke on day of surgery  Induction- intravenous  Postoperative Plan- Plan for postoperative opioid use  Informed Consent- Anesthetic plan and risks discussed with patient  I personally reviewed this patient with the CRNA  Discussed and agreed on the Anesthesia Plan with the CRNA  Tai Chavez

## 2022-10-12 NOTE — INTERVAL H&P NOTE
H&P reviewed  After examining the patient I find no changes in the patients condition since the H&P had been written      Vitals:    10/12/22 0947   BP: 113/76   Pulse: 69   Resp: 18   Temp: (!) 97 °F (36 1 °C)   SpO2: 99%

## 2022-10-12 NOTE — DISCHARGE INSTRUCTIONS
Special Instructions:  Rest right hand and elevate frequently  Do not remove splint  All dressings/bandages to remain intact until your follow-up appointment  May shower with protective cover on right hand  Do not submerge  No driving  Please call Dr [facility] office to move up your post-op appointment to next week

## 2022-10-12 NOTE — OP NOTE
OPERATIVE REPORT  PATIENT NAME: Jamarcus Ybarra    :  1965  MRN: [id number]  Pt Location:  OR ROOM 12    SURGERY DATE: 10/12/2022    Surgeon(s) and Role:     * Kash Reaves MD - Primary    Preop Diagnosis:  Flexor tendon laceration of finger with open wound, initial encounter [A60 009G, S61 209A]  Laceration of digital nerve of finger, initial encounter [S64 40XA]    Post-Op Diagnosis Codes:     * Flexor tendon laceration of finger with open wound, initial encounter [I21 744N, S61 209A]     * Laceration of digital nerve of finger, initial encounter [S64 40XA]    Procedure(s) (LRB):  REPAIR TENDON FINGER/HAND CPT:  78749 (in zone 2) (Right)  DISSECTION/REPAIR NERVE (Right)  (Primary repair of right index finger radial digital nerve  Specimen(s):  * No specimens in log *    Estimated Blood Loss:   Minimal    Drains:  * No LDAs found *    Anesthesia Type:   General    Operative Indications:  Flexor tendon laceration of finger with open wound, initial encounter [T31 751S, S61 209A]  Laceration of digital nerve of finger, initial encounter [S64 40XA]      Operative Findings:  Zone 2 FDP tendon laceration; Radial digital nerve laceration  Complications:   None    Procedure and Technique: The right upper extremity was prepped and draped in a sterile fashion  A an upper arm tourniquet was applied an Esmarch was used to exsanguinate right upper extremity  Hip operative incision was fashioned over the right index finger into the palm  Full-thickness flaps were elevated off of the flexor tendon sheath  Initial dissection demonstrated a radial digital nerve laceration from his injury  The nerve was mobilized proximally and distal to the laceration  There was a scarred entry point in the flexor tendon sheath between the A2 and A4 pulleys  The distal portion of the FDP tendon was noted to be under the A4 pulley  The proximal portion of the flexor tendon was at the level of the A2 pulley    The proximal portion of the FDP tendon was retrieved at the level of the A1 pulley and then passed through the sheath out through an area just proximal to the A4 pulley  There was scarring in the sheath up to the level of the A4 pulley  This area was quite tight and decision was made to release the A4 pulley well of keeping the proximal portion of the sheath intact to prevent bowstringing of the tendon  Primary FDP tendon was repaired in zone 2 using 2 0 Ethibond in a modified Nuñez technique with 4 strands across the repair  The digital nerve was then trimmed back to healthy-appearing fascicles at both the proximal and distal ends  Once this was completed it was determined that primary repair could be performed without tension  8-0 sharpoint nylon was used under loupe magnification for the primary repair  The wound was then irrigated with normal saline  The skin was approximated with 4-0 nylon in interrupted horizontal mattress and simple fashion  Xeroform was applied  A field block and digital block was performed with Marcaine 0 25% plain  For for gauze is placed in the webspaces and over the index finger followed by Webril over wrap  A 3 in Orthoglass splint was fabricated in the intrinsic position followed by an Ace bandage over wrap  The tourniquet was released and the patient was extubated and transferred to recovery room in stable condition  I was present for the entire procedure    Patient Disposition:  PACU      My Assistant was necessary throughout the procedure(s) for retraction and positioning  I understand that section 1842 (b)(7)(D) of the [address] [place] [address] generally prohibits Medicare physician fee schedule payment for the services of assistants-at-surgery in teaching hospitals when qualified residents are available to furnish such services  I certify that the services for which payment is claimed were medically necessary, and that no qualified resident was available to perform the services   I further understand that these services are subject to post-payment review by the Medicare carrier        SIGNATURE: Caren Cristina MD  DATE: October 12, 2022  TIME: 2:04 PM

## 2022-10-13 DIAGNOSIS — S56.129A FLEXOR TENDON LACERATION OF FINGER WITH OPEN WOUND, INITIAL ENCOUNTER: Primary | ICD-10-CM

## 2022-10-13 DIAGNOSIS — S61.209A FLEXOR TENDON LACERATION OF FINGER WITH OPEN WOUND, INITIAL ENCOUNTER: Primary | ICD-10-CM

## 2022-10-13 RX ORDER — OXYCODONE HYDROCHLORIDE AND ACETAMINOPHEN 5; 325 MG/1; MG/1
1 TABLET ORAL EVERY 8 HOURS PRN
Qty: 15 TABLET | Refills: 0 | Status: SHIPPED | OUTPATIENT
Start: 2022-10-13 | End: 2022-10-18 | Stop reason: SDUPTHER

## 2022-10-13 NOTE — TELEPHONE ENCOUNTER
Left patient msg with minimal details due to generic voicemail greeting  Asked patient to call back to schedule a sooner PO appt per msg above

## 2022-10-13 NOTE — TELEPHONE ENCOUNTER
Spoke to patient an appt scheduled for 10/20 and advised on Percocet order  He is taking otc naproxen 2 tabs until he can  RX  He will add tylenol 1000mg due to not having his pain med but will back off on the tylenol once the percocet arrives (3000mg Tylenol daily dose)  Ice 20 on 20 off with elevation above the heart  Will call with any worsening of symptoms

## 2022-10-13 NOTE — TELEPHONE ENCOUNTER
Caller: Cristina Berumen    Doctor: Nighat Barone    Reason for call: Patient calling in stating that he had surgery with Dr Nighat Barone and he rates his pain is a 10/10  He is stating that the Naprosyn is really helping  He is asking if something else can be prescribed  He stated that the medication he received in  the hospital seemed to help  He has friend coming this morning and she would be able to get to pharmacy today  Patient does not drive due to disability  Call back#: [phone]    He is asking if something can be sent to pharmacy below      [id number] [place] - [address]  [phone]

## 2022-10-13 NOTE — TELEPHONE ENCOUNTER
Rx Percocet sent to pharmacy  Patient has a documented allergy to Hydrocodone, however he stated he is not allergic to Percocet, and tolerated this without issue yesterday in the hospital   Please move up patient's post-op appointment to next week, 1 week from surgery date    Thank you

## 2022-10-13 NOTE — TELEPHONE ENCOUNTER
Caller: Julieta Quintero    Doctor/Office: Ernie Desir asked to speak to: triage nurse regarding phone call left for him  Patient told me his pain woke him up at 3 AM today  Advised nurse of this so she was aware      Call was transferred to: Triage nurse

## 2022-10-18 ENCOUNTER — TELEPHONE (OUTPATIENT)
Dept: OBGYN CLINIC | Facility: HOSPITAL | Age: 57
End: 2022-10-18

## 2022-10-18 DIAGNOSIS — Z48.89 AFTERCARE FOLLOWING SURGERY: ICD-10-CM

## 2022-10-18 DIAGNOSIS — S56.129A FLEXOR TENDON LACERATION OF FINGER WITH OPEN WOUND, INITIAL ENCOUNTER: ICD-10-CM

## 2022-10-18 DIAGNOSIS — S61.209A FLEXOR TENDON LACERATION OF FINGER WITH OPEN WOUND, INITIAL ENCOUNTER: ICD-10-CM

## 2022-10-18 RX ORDER — OXYCODONE HYDROCHLORIDE AND ACETAMINOPHEN 5; 325 MG/1; MG/1
1 TABLET ORAL EVERY 12 HOURS PRN
Qty: 10 TABLET | Refills: 0 | Status: SHIPPED | OUTPATIENT
Start: 2022-10-18

## 2022-10-18 RX ORDER — NAPROXEN 500 MG/1
500 TABLET ORAL 2 TIMES DAILY WITH MEALS
Qty: 20 TABLET | Refills: 0 | Status: SHIPPED | OUTPATIENT
Start: 2022-10-18

## 2022-10-18 NOTE — TELEPHONE ENCOUNTER
Pt contacted Call Center requested refill of their medication  They have 1 pills remaining  Patient CB: [phone]      oxyCODONE-acetaminophen (Percocet) 5-325 mg per tablet [[id number]]     Order Details  Dose: 1 tablet Route: Oral Frequency: Every 8 hours PRN for moderate pain, severe pain   Dispense Quantity: 15 tablet Refills: 0    Note to Pharmacy: Continuation of treatment         Sig: Take 1 tablet by mouth every 8 (eight) hours as needed for moderate pain or severe pain Max Daily Amount: 3 tablets         Start Date: 10/13/22 End Date: --   Written Date: 10/13/22 Expiration Date: 12/12/22   Earliest Fill Date: 10/13/22         Diagnosis Association: Flexor tendon laceration of finger with open wound, initial encounter (X47 102M , S61 209A)     Patient has 3 Naproxen pills left    naproxen (EC NAPROSYN) 500 MG EC tablet [[id number]]     Order Details  Dose: 500 mg Route: Oral Frequency: 2 times daily with meals   Dispense Quantity: 20 tablet Refills: 0          Sig: Take 1 tablet (500 mg total) by mouth 2 (two) times a day with meals         Start Date: 10/12/22 End Date: --   Written Date: 10/12/22 Expiration Date: 10/12/23       Diagnosis Association: Aftercare following surgery (Z48 89)             Pharmacy  [id number]- [place], [address] [place] [address]  [phone]      Thank you  PATIENT ADVISED:    REFILL REQUESTS WILL BE PROCESSED WITHIN 24-48 HOURS

## 2022-10-20 ENCOUNTER — OFFICE VISIT (OUTPATIENT)
Dept: OCCUPATIONAL THERAPY | Age: 57
End: 2022-10-20
Payer: COMMERCIAL

## 2022-10-20 ENCOUNTER — OFFICE VISIT (OUTPATIENT)
Dept: OBGYN CLINIC | Facility: CLINIC | Age: 57
End: 2022-10-20

## 2022-10-20 VITALS
BODY MASS INDEX: 20.09 KG/M2 | HEART RATE: 78 BPM | DIASTOLIC BLOOD PRESSURE: 77 MMHG | SYSTOLIC BLOOD PRESSURE: 125 MMHG | HEIGHT: 66 IN | WEIGHT: 125 LBS

## 2022-10-20 DIAGNOSIS — S61.209A FLEXOR TENDON LACERATION OF FINGER WITH OPEN WOUND, INITIAL ENCOUNTER: ICD-10-CM

## 2022-10-20 DIAGNOSIS — S56.129A FLEXOR TENDON LACERATION OF FINGER WITH OPEN WOUND, INITIAL ENCOUNTER: Primary | ICD-10-CM

## 2022-10-20 DIAGNOSIS — Z48.89 AFTERCARE FOLLOWING SURGERY: ICD-10-CM

## 2022-10-20 DIAGNOSIS — S61.209A FLEXOR TENDON LACERATION OF FINGER WITH OPEN WOUND, INITIAL ENCOUNTER: Primary | ICD-10-CM

## 2022-10-20 DIAGNOSIS — S56.129A FLEXOR TENDON LACERATION OF FINGER WITH OPEN WOUND, INITIAL ENCOUNTER: ICD-10-CM

## 2022-10-20 PROCEDURE — 99024 POSTOP FOLLOW-UP VISIT: CPT | Performed by: SURGERY

## 2022-10-20 PROCEDURE — L3808 WHFO, RIGID W/O JOINTS: HCPCS

## 2022-10-20 NOTE — PROGRESS NOTES
Orthosis    Diagnosis:   1  Flexor tendon laceration of finger with open wound, initial encounter  Ambulatory Referral to PT/OT Hand Therapy   2  Aftercare following surgery  Ambulatory Referral to PT/OT Hand Therapy     Indication: Tendon repair    Location: Right  wrist, hand, index finger, long finger, ring finger and small finger  Supplies: Custom Fit Orthotic  Orthosis type: Dorsal Block  Wearing Schedule: Remove with Protected Technique Only as Needed  Describe Position: Wrist in slight flexion, MCPs in 50 degrees flexion, Ips in neutral    Precautions: Tendon repair, and Universal (skin contact/breakdown)    Patient or Caregiver expresses understanding of wearing Schedule and Precautions? Yes  Patient or Caregiver able to don/doff orthotic independently? Yes    Written orders provided to patient?  No  Orders Obtained: Written  Orders Obtained from: Jammie Wood    Return for evaluation and treatment Yes

## 2022-10-20 NOTE — PROGRESS NOTES
Assessment:    S/p right index finger flexor tendon repair (zone 2), and primary repair of right index finger radial digital nerve on 10/12/2022      Plan:    Referral to OT/hand therapy for custom splint and initiation of flexor tendon repair protocol  No further narcotic medication prescriptions from our standpoint  Can continue naproxen  Follow-up next week for suture removal              Subjective:     HPI    Patient ID:  Reny Henning is a 61 y o  male S/p right index finger flexor tendon repair (zone 2), and primary repair of right index finger radial digital nerve on 10/12/2022  Betty Cruz complains of pain in his index finger today  He feels like he is still in need of narcotic pain medication   He does have numbness in the distal phalanx in the same location as previous  He has not noticed any issues with his incision  The following portions of the patient's history were reviewed and updated as appropriate: allergies, current medications, past family history, past medical history, past social history, past surgical history, and problem list     Review of Systems     Objective:    Imaging:  None       Physical Exam     Vitals:    10/20/22 0931   BP: 125/77   Pulse: 78       Orthopedic Examination:  Right index finger     Volar based finger incision sutures clean dry intact, no erythema drainage or wound dehiscence  Skin edges are all approximated  Normal sensation with patchy numbness at the radial side of the distal phalanx finger pad  Good cap refill to fingertips

## 2022-10-26 ENCOUNTER — OFFICE VISIT (OUTPATIENT)
Dept: OBGYN CLINIC | Facility: CLINIC | Age: 57
End: 2022-10-26

## 2022-10-26 VITALS — HEIGHT: 66 IN | WEIGHT: 125 LBS | BODY MASS INDEX: 20.09 KG/M2

## 2022-10-26 DIAGNOSIS — S64.40XD LACERATION OF DIGITAL NERVE OF FINGER, SUBSEQUENT ENCOUNTER: ICD-10-CM

## 2022-10-26 DIAGNOSIS — S61.209D FLEXOR TENDON LACERATION OF FINGER WITH OPEN WOUND, SUBSEQUENT ENCOUNTER: Primary | ICD-10-CM

## 2022-10-26 DIAGNOSIS — S56.129D FLEXOR TENDON LACERATION OF FINGER WITH OPEN WOUND, SUBSEQUENT ENCOUNTER: Primary | ICD-10-CM

## 2022-10-26 PROCEDURE — 99024 POSTOP FOLLOW-UP VISIT: CPT | Performed by: SURGERY

## 2022-10-26 NOTE — PROGRESS NOTES
S/p right index finger flexor tendon repair (zone 2), and primary repair of right index finger radial digital nerve on 10/12/2022  Presents for suture removal today  Reports he picked 1 or 2 sutures out and has opening in skin with white discharge  No fever or chills  No erythema of the finger  Exam reveals open ulceration volar middle phalanx region with wet fibrinous material, no significant erythema of the digit  The remaining incision is c/d/i  Sutures removed  Area cleansed with soapy water and gauze      Cleanse open area with soapy water and pat dry several times / day  Continue flexor tendon repair therapy protocol  Continue custom splint  Follow-up 2 weeks for re-evaluation

## 2022-12-14 ENCOUNTER — TELEPHONE (OUTPATIENT)
Dept: OBGYN CLINIC | Facility: HOSPITAL | Age: 57
End: 2022-12-14

## 2022-12-14 NOTE — TELEPHONE ENCOUNTER
Hello,  Please advise if the following patient can be forced onto the schedule:    Patient: Eli Box    : 1965    MRN: [id number]    Call back #: Ruba Joe his     Insurance:    Reason for appointment: follow 2 wks, pt had to cx for today is sick    Requested doctor/location: [facility] appt      Thank you

## 2022-12-14 NOTE — TELEPHONE ENCOUNTER
Caller: Patient    Doctor/Office: Papi    Call regarding :  Scheduling appointment     Call was transferred to: hand

## (undated) DEVICE — CAST PADDING 4 IN SYNTHETIC NON-STRL

## (undated) DEVICE — CHLORAPREP HI-LITE 26ML ORANGE

## (undated) DEVICE — BANDAGE, ESMARK LF STR 4"X9'(20/CS): Brand: CYPRESS

## (undated) DEVICE — DISPOSABLE OR TOWEL: Brand: CARDINAL HEALTH

## (undated) DEVICE — CUFF TOURNIQUET 18 X 4 IN QUICK CONNECT DISP 1 BLADDER

## (undated) DEVICE — SUT ETHILON 4-0 PS-2 18 IN 1667H

## (undated) DEVICE — NDL CNTR 20CT FM MAG: Brand: MEDLINE INDUSTRIES, INC.

## (undated) DEVICE — STERILE POLYISOPRENE POWDER-FREE SURGICAL GLOVES: Brand: PROTEXIS

## (undated) DEVICE — ACE WRAP 3 IN UNSTERILE

## (undated) DEVICE — BULB SYRINGE,IRRIGATION WITH PROTECTIVE CAP: Brand: DOVER

## (undated) DEVICE — SYRINGE 30ML LL

## (undated) DEVICE — BASIC PACK: Brand: CONVERTORS

## (undated) DEVICE — SUTURE ETHIBOND EXCEL 48(122CM) 2-0 GRN

## (undated) DEVICE — SPLINT 3 IN X 15 FT DYNACAST S

## (undated) DEVICE — STERILE POLYISOPRENE POWDER-FREE SURGICAL GLOVES WITH EMOLLIENT COATING: Brand: PROTEXIS

## (undated) DEVICE — GAUZE SPONGES,16 PLY: Brand: CURITY

## (undated) DEVICE — DRM6 8-0 BK MONO NYL 5"/13 CM: Brand: DRM6 8-0 BK MONO NYL 5"/13 CM

## (undated) DEVICE — SCD SEQUENTIAL COMPRESSION COMFORT SLEEVE MEDIUM KNEE LENGTH: Brand: KENDALL SCD

## (undated) DEVICE — INTENDED FOR TISSUE SEPARATION, AND OTHER PROCEDURES THAT REQUIRE A SHARP SURGICAL BLADE TO PUNCTURE OR CUT.: Brand: BARD-PARKER ® SAFETYLOCK CARBON RIB-BACK BLADES

## (undated) DEVICE — TIBURON HAND DRAPE: Brand: CONVERTORS

## (undated) DEVICE — PADDING CAST 3IN COTTON STRL

## (undated) DEVICE — X-RAY DETECTABLE SPONGES,16 PLY: Brand: VISTEC

## (undated) DEVICE — NEEDLE 22 G X 1 1/2 SAFETY

## (undated) DEVICE — STANDARD SURGICAL GOWN, L: Brand: CONVERTORS

## (undated) DEVICE — NEEDLE BLUNT 18 G X 1 1/2IN

## (undated) DEVICE — OCCLUSIVE GAUZE STRIP,3% BISMUTH TRIBROMOPHENATE IN PETROLATUM BLEND: Brand: XEROFORM

## (undated) DEVICE — GLOVE INDICATOR PI UNDERGLOVE SZ 6.5 BLUE